# Patient Record
Sex: MALE | Race: WHITE | Employment: OTHER | URBAN - METROPOLITAN AREA
[De-identification: names, ages, dates, MRNs, and addresses within clinical notes are randomized per-mention and may not be internally consistent; named-entity substitution may affect disease eponyms.]

---

## 2017-05-10 ENCOUNTER — OFFICE VISIT (OUTPATIENT)
Dept: PRIMARY CARE CLINIC | Age: 77
End: 2017-05-10
Payer: MEDICARE

## 2017-05-10 VITALS
BODY MASS INDEX: 29.93 KG/M2 | HEART RATE: 56 BPM | RESPIRATION RATE: 16 BRPM | HEIGHT: 72 IN | DIASTOLIC BLOOD PRESSURE: 80 MMHG | SYSTOLIC BLOOD PRESSURE: 130 MMHG | WEIGHT: 221 LBS

## 2017-05-10 DIAGNOSIS — E78.5 DYSLIPIDEMIA: Chronic | ICD-10-CM

## 2017-05-10 DIAGNOSIS — I10 ESSENTIAL HYPERTENSION: Primary | Chronic | ICD-10-CM

## 2017-05-10 DIAGNOSIS — Z12.5 ENCOUNTER FOR PROSTATE CANCER SCREENING: ICD-10-CM

## 2017-05-10 PROCEDURE — 99214 OFFICE O/P EST MOD 30 MIN: CPT | Performed by: INTERNAL MEDICINE

## 2017-05-10 ASSESSMENT — ENCOUNTER SYMPTOMS
SHORTNESS OF BREATH: 0
BLURRED VISION: 0
ABDOMINAL PAIN: 0
ORTHOPNEA: 0
WHEEZING: 0
CONSTIPATION: 0

## 2017-09-06 RX ORDER — ATENOLOL 100 MG/1
50 TABLET ORAL DAILY
Qty: 15 TABLET | Refills: 1 | Status: SHIPPED | OUTPATIENT
Start: 2017-09-06 | End: 2017-10-11 | Stop reason: ALTCHOICE

## 2017-09-18 ENCOUNTER — HOSPITAL ENCOUNTER (OUTPATIENT)
Age: 77
Discharge: HOME | End: 2017-09-18
Payer: MEDICARE

## 2017-09-18 DIAGNOSIS — Z12.5 ENCOUNTER FOR PROSTATE CANCER SCREENING: ICD-10-CM

## 2017-09-18 DIAGNOSIS — E78.5 DYSLIPIDEMIA: Chronic | ICD-10-CM

## 2017-09-18 DIAGNOSIS — I10 ESSENTIAL HYPERTENSION: Chronic | ICD-10-CM

## 2017-09-18 LAB
ALBUMIN SERPL-MCNC: 4.3 G/DL (ref 3.5–5.2)
ALBUMIN/GLOBULIN RATIO: 1.7 (ref 1–2.5)
ALP BLD-CCNC: 70 U/L (ref 40–129)
ALT SERPL-CCNC: 17 U/L (ref 5–41)
ANION GAP SERPL CALCULATED.3IONS-SCNC: 13 MMOL/L (ref 9–17)
AST SERPL-CCNC: 18 U/L
BILIRUB SERPL-MCNC: 0.89 MG/DL (ref 0.3–1.2)
BUN BLDV-MCNC: 21 MG/DL (ref 8–23)
BUN/CREAT BLD: ABNORMAL (ref 9–20)
CALCIUM SERPL-MCNC: 9.1 MG/DL (ref 8.6–10.4)
CHLORIDE BLD-SCNC: 105 MMOL/L (ref 98–107)
CHOLESTEROL/HDL RATIO: 2.9
CHOLESTEROL: 171 MG/DL
CO2: 23 MMOL/L (ref 20–31)
CREAT SERPL-MCNC: 0.99 MG/DL (ref 0.7–1.2)
GFR AFRICAN AMERICAN: >60 ML/MIN
GFR NON-AFRICAN AMERICAN: >60 ML/MIN
GFR SERPL CREATININE-BSD FRML MDRD: ABNORMAL ML/MIN/{1.73_M2}
GFR SERPL CREATININE-BSD FRML MDRD: ABNORMAL ML/MIN/{1.73_M2}
GLUCOSE BLD-MCNC: 101 MG/DL (ref 70–99)
HCT VFR BLD CALC: 49.4 % (ref 41–53)
HDLC SERPL-MCNC: 58 MG/DL
HEMOGLOBIN: 16.5 G/DL (ref 13.5–17.5)
LDL CHOLESTEROL: 99 MG/DL (ref 0–130)
MCH RBC QN AUTO: 31.8 PG (ref 26–34)
MCHC RBC AUTO-ENTMCNC: 33.4 G/DL (ref 31–37)
MCV RBC AUTO: 95 FL (ref 80–100)
PDW BLD-RTO: 14.1 % (ref 12.5–15.4)
PLATELET # BLD: 128 K/UL (ref 140–450)
PMV BLD AUTO: 8.6 FL (ref 6–12)
POTASSIUM SERPL-SCNC: 4.4 MMOL/L (ref 3.7–5.3)
PROSTATE SPECIFIC ANTIGEN: 2.98 UG/L
RBC # BLD: 5.2 M/UL (ref 4.5–5.9)
SODIUM BLD-SCNC: 141 MMOL/L (ref 135–144)
TOTAL PROTEIN: 6.8 G/DL (ref 6.4–8.3)
TRIGL SERPL-MCNC: 71 MG/DL
TSH SERPL DL<=0.05 MIU/L-ACNC: 2.37 MIU/L (ref 0.3–5)
VLDLC SERPL CALC-MCNC: NORMAL MG/DL (ref 1–30)
WBC # BLD: 5.4 K/UL (ref 3.5–11)

## 2017-09-18 PROCEDURE — 80061 LIPID PANEL: CPT

## 2017-09-18 PROCEDURE — 85027 COMPLETE CBC AUTOMATED: CPT

## 2017-09-18 PROCEDURE — 84443 ASSAY THYROID STIM HORMONE: CPT

## 2017-09-18 PROCEDURE — G0103 PSA SCREENING: HCPCS

## 2017-09-18 PROCEDURE — 36415 COLL VENOUS BLD VENIPUNCTURE: CPT

## 2017-09-18 PROCEDURE — 80053 COMPREHEN METABOLIC PANEL: CPT

## 2017-10-02 RX ORDER — ATORVASTATIN CALCIUM 10 MG/1
TABLET, FILM COATED ORAL
Qty: 90 TABLET | Refills: 3 | Status: SHIPPED | OUTPATIENT
Start: 2017-10-02 | End: 2018-10-01 | Stop reason: SDUPTHER

## 2017-10-09 RX ORDER — ATENOLOL 100 MG/1
50 TABLET ORAL DAILY
Qty: 15 TABLET | Refills: 0 | OUTPATIENT
Start: 2017-10-09

## 2017-10-09 NOTE — TELEPHONE ENCOUNTER
Not my patient?>>  The patient has two Rx's in the chart fro atenolol  50 mg qd  100 mg 1/2 daily  If he is only taking 50 mg / day - why is he breaking 100's in half  Does he want 30 d or 90 d supply  Please clarify

## 2017-10-09 NOTE — TELEPHONE ENCOUNTER
Last OV 5/10/17  Dr Elizabeth Schmitt Maintenance   Topic Date Due    Flu vaccine (1) 09/01/2017    Lipid screen  09/18/2022    DTaP/Tdap/Td vaccine (2 - Td) 11/15/2026    Zostavax vaccine  Addressed    Pneumococcal low/med risk  Completed             (applicable per patient's age: Cancer Screenings, Depression Screening, Fall Risk Screening, Immunizations)    LDL Cholesterol (mg/dL)   Date Value   09/18/2017 99     LDL Calculated (mg/dL)   Date Value   03/14/2013 86     AST (U/L)   Date Value   09/18/2017 18     ALT (U/L)   Date Value   09/18/2017 17     BUN (mg/dL)   Date Value   09/18/2017 21      (goal A1C is < 7)   (goal LDL is <100) need 30-50% reduction from baseline     BP Readings from Last 3 Encounters:   05/10/17 130/80   10/04/16 130/74   03/24/16 132/82    (goal /80)      All Future Testing planned in [facility]:      Next Visit Date:  Future Appointments  Date Time Provider Patricia Marrero   10/11/2017 10:30 AM Alpa Tee CNP [facility]   12/11/2017 2:20 PM Alcides Chanel DO [facility]            Patient Active Problem List:     HTN (hypertension)     Dyslipidemia     GERD (gastroesophageal reflux disease)     Diverticulosis of colon     History of basal cell cancer

## 2017-10-10 NOTE — TELEPHONE ENCOUNTER
The Pharmacy is out of Atenolol 50mg so it must be called in as 100mg 1/2 daily  Pt has appt this week in office  Qty called to [address] with 0 RF as pt is out of medication

## 2017-10-11 ENCOUNTER — OFFICE VISIT (OUTPATIENT)
Dept: PRIMARY CARE CLINIC | Age: 77
End: 2017-10-11
Payer: MEDICARE

## 2017-10-11 VITALS
WEIGHT: 222 LBS | HEIGHT: 71 IN | OXYGEN SATURATION: 96 % | BODY MASS INDEX: 31.08 KG/M2 | SYSTOLIC BLOOD PRESSURE: 152 MMHG | DIASTOLIC BLOOD PRESSURE: 86 MMHG | HEART RATE: 59 BPM

## 2017-10-11 DIAGNOSIS — I10 ESSENTIAL HYPERTENSION: Primary | Chronic | ICD-10-CM

## 2017-10-11 PROCEDURE — 99214 OFFICE O/P EST MOD 30 MIN: CPT | Performed by: NURSE PRACTITIONER

## 2017-10-11 PROCEDURE — G8427 DOCREV CUR MEDS BY ELIG CLIN: HCPCS | Performed by: NURSE PRACTITIONER

## 2017-10-11 PROCEDURE — 4040F PNEUMOC VAC/ADMIN/RCVD: CPT | Performed by: NURSE PRACTITIONER

## 2017-10-11 PROCEDURE — G8417 CALC BMI ABV UP PARAM F/U: HCPCS | Performed by: NURSE PRACTITIONER

## 2017-10-11 PROCEDURE — G8484 FLU IMMUNIZE NO ADMIN: HCPCS | Performed by: NURSE PRACTITIONER

## 2017-10-11 PROCEDURE — 1123F ACP DISCUSS/DSCN MKR DOCD: CPT | Performed by: NURSE PRACTITIONER

## 2017-10-11 PROCEDURE — 1036F TOBACCO NON-USER: CPT | Performed by: NURSE PRACTITIONER

## 2017-10-11 RX ORDER — ATENOLOL 50 MG/1
TABLET ORAL
Qty: 90 TABLET | Refills: 3 | Status: SHIPPED | OUTPATIENT
Start: 2017-10-11 | End: 2018-10-01 | Stop reason: SDUPTHER

## 2017-10-11 RX ORDER — LISINOPRIL 20 MG/1
20 TABLET ORAL DAILY
Qty: 30 TABLET | Refills: 3 | Status: SHIPPED | OUTPATIENT
Start: 2017-10-11 | End: 2017-10-18 | Stop reason: SDUPTHER

## 2017-10-11 ASSESSMENT — ENCOUNTER SYMPTOMS
BLURRED VISION: 0
ABDOMINAL PAIN: 0
SHORTNESS OF BREATH: 0
COUGH: 0
ORTHOPNEA: 0
BACK PAIN: 0

## 2017-10-11 NOTE — PATIENT INSTRUCTIONS
Starting 10/12/17 Take lisinopril in the am and then take atenolol at night. lisinopril  Pronunciation:  lyse IN oh pril  Brand:  Prinivil, Qbrelis, Zestril  What is the most important information I should know about lisinopril? Do not use lisinopril if you are pregnant. It could harm the unborn baby. Stop using this medicine and tell your doctor right away if you become pregnant. You should not use lisinopril if you have hereditary angioedema. If you have diabetes, do not use lisinopril together with any medication that contains aliskiren (such as Amturnide, Tekturna, Tekamlo). What is lisinopril? Lisinopril is an ACE inhibitor. ACE stands for angiotensin converting enzyme. Lisinopril is used to treat high blood pressure (hypertension) in adults and children who are at least 10years old. Lisinopril is also used to treat congestive heart failure in adults, or to improve survival after a heart attack. Lisinopril may also be used for purposes not listed in this medication guide. What should I discuss with my healthcare provider before taking lisinopril? You should not use this medication if you are allergic to lisinopril or to any other ACE inhibitor, such as benazepril captopril, fosinopril, enalapril, moexipril, perindopril, quinapril, ramipril, or trandolapril. If you have diabetes, do not use lisinopril together with any medication that contains aliskiren (Amturnide, Tekturna, Tekamlo). You may also need to avoid taking lisinopril with aliskiren if you have kidney disease. You should not use lisinopril if you have hereditary angioedema. To make sure lisinopril is safe for you, tell your doctor if you have:  · kidney disease (or if you are on dialysis);  · liver disease;  · diabetes; or  · high levels of potassium in your blood. Do not use if you are pregnant. If you become pregnant, stop taking this medicine and tell your doctor right away.  Lisinopril can cause injury or death to the unborn baby if you take the medicine during your second or third trimester. It is not known whether lisinopril passes into breast milk or if it could harm a nursing baby. You should not breast-feed while using this medicine. How should I take lisinopril? Follow all directions on your prescription label. Your doctor may occasionally change your dose to make sure you get the best results. Do not take this medicine in larger or smaller amounts or for longer than recommended. Drink plenty of water each day while you are taking this medicine. Lisinopril can be taken with or without food. Measure liquid medicine with the dosing syringe provided, or with a special dose-measuring spoon or medicine cup. If you do not have a dose-measuring device, ask your pharmacist for one. Your blood pressure will need to be checked often, and you may need frequent blood tests. Call your doctor if you have ongoing vomiting or diarrhea, or if you are sweating more than usual. You can easily become dehydrated while taking this medicine. This can lead to very low blood pressure, electrolyte disorders, or kidney failure while you are taking lisinopril. If you need surgery, tell the surgeon ahead of time that you are using lisinopril. You may need to stop using the medicine for a short time. If you are being treated for high blood pressure, keep using this medicine even if you feel well. High blood pressure often has no symptoms. You may need to use blood pressure medicine for the rest of your life. Store at room temperature away from moisture and heat. Do not freeze the oral liquid. What happens if I miss a dose? Take the missed dose as soon as you remember. Skip the missed dose if it is almost time for your next scheduled dose. Do not take extra medicine to make up the missed dose. What happens if I overdose? Seek emergency medical attention or call the Poison Help line at 1-412.655.4939.   What should I avoid while taking temsirolimus; or  · NSAIDs (nonsteroidal anti-inflammatory drugs) --aspirin, ibuprofen (Advil, Motrin), naproxen (Aleve), celecoxib, diclofenac, indomethacin, meloxicam, and others. This list is not complete. Other drugs may interact with lisinopril, including prescription and over-the-counter medicines, vitamins, and herbal products. Not all possible interactions are listed in this medication guide. Where can I get more information? Your pharmacist can provide more information about lisinopril. Remember, keep this and all other medicines out of the reach of children, never share your medicines with others, and use this medication only for the indication prescribed. Every effort has been made to ensure that the information provided by [facility] is accurate, up-to-date, and complete, but no guarantee is made to that effect. Drug information contained herein may be time sensitive. [facility] information has been compiled for use by healthcare practitioners and consumers in the [place] and therefore [facility] does not warrant that uses outside of the [place] are appropriate, unless specifically indicated otherwise. [facility] drug information does not endorse drugs, diagnose patients or recommend therapy. [facility] drug information is an informational resource designed to assist licensed healthcare practitioners in caring for their patients and/or to serve consumers viewing this service as a supplement to, and not a substitute for, the expertise, skill, knowledge and judgment of healthcare practitioners. The absence of a warning for a given drug or drug combination in no way should be construed to indicate that the drug or drug combination is safe, effective or appropriate for any given patient. [facility] does not assume any responsibility for any aspect of healthcare administered with the aid of information [facility] provides.  The information contained herein is not intended to cover all possible uses, directions, precautions, warnings, drug interactions, allergic reactions, or adverse effects. If you have questions about the drugs you are taking, check with your doctor, nurse or pharmacist.  Copyright 1886-6749 [address]: 13.04. Revision date: 9/19/2016. Care instructions adapted under license by [facility] ([facility]). If you have questions about a medical condition or this instruction, always ask your healthcare professional. [place] [address] any warranty or liability for your use of this information.

## 2017-10-18 ENCOUNTER — OFFICE VISIT (OUTPATIENT)
Dept: PRIMARY CARE CLINIC | Age: 77
End: 2017-10-18
Payer: MEDICARE

## 2017-10-18 VITALS — HEART RATE: 68 BPM | DIASTOLIC BLOOD PRESSURE: 72 MMHG | SYSTOLIC BLOOD PRESSURE: 150 MMHG

## 2017-10-18 DIAGNOSIS — I10 ESSENTIAL HYPERTENSION: Chronic | ICD-10-CM

## 2017-10-18 PROCEDURE — G8417 CALC BMI ABV UP PARAM F/U: HCPCS | Performed by: NURSE PRACTITIONER

## 2017-10-18 PROCEDURE — 4040F PNEUMOC VAC/ADMIN/RCVD: CPT | Performed by: NURSE PRACTITIONER

## 2017-10-18 PROCEDURE — 1123F ACP DISCUSS/DSCN MKR DOCD: CPT | Performed by: NURSE PRACTITIONER

## 2017-10-18 PROCEDURE — 1036F TOBACCO NON-USER: CPT | Performed by: NURSE PRACTITIONER

## 2017-10-18 PROCEDURE — 99213 OFFICE O/P EST LOW 20 MIN: CPT | Performed by: NURSE PRACTITIONER

## 2017-10-18 PROCEDURE — G8427 DOCREV CUR MEDS BY ELIG CLIN: HCPCS | Performed by: NURSE PRACTITIONER

## 2017-10-18 PROCEDURE — G8484 FLU IMMUNIZE NO ADMIN: HCPCS | Performed by: NURSE PRACTITIONER

## 2017-10-18 RX ORDER — LISINOPRIL 20 MG/1
40 TABLET ORAL DAILY
Qty: 30 TABLET | Refills: 3 | Status: SHIPPED | OUTPATIENT
Start: 2017-10-18 | End: 2018-02-19 | Stop reason: SDUPTHER

## 2017-10-18 ASSESSMENT — ENCOUNTER SYMPTOMS
SHORTNESS OF BREATH: 0
BACK PAIN: 0
COUGH: 0
ORTHOPNEA: 0
BLURRED VISION: 0
ABDOMINAL PAIN: 0

## 2017-10-18 NOTE — PROGRESS NOTES
[facility]  [facility]  [address]  [address]  [place] [address]  Dept: [phone]  Dept Fax: [phone]    Kori Castillo is a 65 y.o. male who presents today for his medical conditions/complaints as noted below. Kori Castillo is c/o of Hypertension (recheck after one week with lisinopril)        HPI:     Did not bring in results from [address] [facility] BP ranges from 138-174/    Also mentions that at health fair he was identified to be at risk for osteoporosis. Does not know which test they used to tell him this      Hypertension   This is a chronic problem. The current episode started more than 1 year ago. The problem has been gradually improving since onset. The problem is resistant. Pertinent negatives include no anxiety, blurred vision, chest pain, headaches, malaise/fatigue, neck pain, orthopnea, palpitations, peripheral edema, PND, shortness of breath or sweats. Risk factors for coronary artery disease include obesity and male gender. Past treatments include ACE inhibitors and beta blockers. The current treatment provides moderate improvement. There are no compliance problems. Past Medical History:   Diagnosis Date    Cancer ([address]     basal cell    Diverticulosis     coli    Plantar fasciitis       Past Surgical History:   Procedure Laterality Date    CATARACT REMOVAL WITH IMPLANT Bilateral may 2013    COLONOSCOPY      HERNIA REPAIR      SKIN CANCER EXCISION      multi sites       History reviewed. No pertinent family history.     Social History   Substance Use Topics    Smoking status: Former Smoker     Types: Cigarettes     Quit date: 1/1/1981    Smokeless tobacco: Never Used    Alcohol use Yes      Comment: 1 drink daily      Current Outpatient Prescriptions   Medication Sig Dispense Refill    lisinopril (PRINIVIL;ZESTRIL) 20 MG tablet Take 2 tablets by mouth daily 30 tablet 3    atenolol (TENORMIN) 50 MG tablet TAKE ONE TABLET BY MOUTH ONCE DAILY 90 tablet 3    atorvastatin (LIPITOR) 10 MG tablet TAKE ONE TABLET BY MOUTH ONCE DAILY 90 tablet 3    clobetasol (TEMOVATE) 0.05 % external solution       cimetidine (TAGAMET HB) 200 MG tablet Take 200 mg by mouth 2 times daily      diphenhydrAMINE (BENADRYL) 25 MG capsule Take 25 mg by mouth every 6 hours as needed for Itching      cetirizine (ZYRTEC) 10 MG tablet Take 10 mg by mouth daily      halobetasol (ULTRAVATE) 0.05 % ointment       Multiple Vitamins-Minerals (THERAPEUTIC MULTIVITAMIN-MINERALS) tablet Take 1 tablet by mouth daily.  aspirin 81 MG tablet Take  by mouth daily. No current facility-administered medications for this visit. Allergies   Allergen Reactions    Sulfa Antibiotics        Health Maintenance   Topic Date Due    Flu vaccine (1) 09/01/2017    Lipid screen  09/18/2022    DTaP/Tdap/Td vaccine (2 - Td) 11/15/2026    Zostavax vaccine  Addressed    Pneumococcal low/med risk  Completed       Subjective:      Review of Systems   Constitutional: Negative for chills, fever and malaise/fatigue. HENT: Negative for congestion. Eyes: Negative for blurred vision and visual disturbance. Respiratory: Negative for cough and shortness of breath. Cardiovascular: Negative for chest pain, palpitations, orthopnea and PND. Gastrointestinal: Negative for abdominal pain. Genitourinary: Negative for difficulty urinating and dysuria. Musculoskeletal: Negative for arthralgias, back pain and neck pain. Neurological: Negative for dizziness and headaches. Psychiatric/Behavioral: Negative for self-injury and sleep disturbance. Objective:     Physical Exam   Constitutional: He is oriented to person, place, and time. He appears well-developed and well-nourished. HENT:   Head: Normocephalic and atraumatic. Eyes: Pupils are equal, round, and reactive to light. Neck: Normal range of motion.    Cardiovascular: Normal rate, regular rhythm and normal heart sounds. Pulmonary/Chest: Effort normal and breath sounds normal.   Abdominal: Soft. Bowel sounds are normal. There is no tenderness. Musculoskeletal: Normal range of motion. Neurological: He is alert and oriented to person, place, and time. Skin: Skin is warm and dry. Psychiatric: He has a normal mood and affect. His behavior is normal. Judgment and thought content normal.   Nursing note and vitals reviewed. BP (!) 150/72   Pulse 68     Assessment:      1. Essential hypertension  lisinopril (PRINIVIL;ZESTRIL) 20 MG tablet       Plan:      Return in about 1 week (around 10/25/2017) for hypertension. 1. HTN- Improving. Increase lisinopril to 40mg daily. Continue to monitor BP at home. Follow up in one week to recheck BP. Patient is instructed to bring all paperwork from health fair and home BP machine. Orders Placed This Encounter   Medications    lisinopril (PRINIVIL;ZESTRIL) 20 MG tablet     Sig: Take 2 tablets by mouth daily     Dispense:  30 tablet     Refill:  3       Patient given educational materials - see patient instructions. Discussed use, benefit, and side effects of prescribed medications. All patient questions answered. Pt voiced understanding. Reviewed health maintenance. Instructed to continue current medications, diet and exercise. Patient agreed with treatment plan. Follow up as directed.       Electronically signed by Adonis Holly CNP on 10/18/2017 at 11:15 AM

## 2017-10-18 NOTE — PATIENT INSTRUCTIONS
Please bring in all paper work from health fair  Please bring home BP monitor to check with our readings, continue to monitor BP home  Increase lisinopril to 40mg (or two tabs 20mg) both at morning

## 2017-10-25 ENCOUNTER — OFFICE VISIT (OUTPATIENT)
Dept: PRIMARY CARE CLINIC | Age: 77
End: 2017-10-25
Payer: MEDICARE

## 2017-10-25 VITALS
HEIGHT: 70 IN | DIASTOLIC BLOOD PRESSURE: 86 MMHG | WEIGHT: 217 LBS | SYSTOLIC BLOOD PRESSURE: 164 MMHG | BODY MASS INDEX: 31.07 KG/M2 | HEART RATE: 61 BPM | OXYGEN SATURATION: 97 %

## 2017-10-25 DIAGNOSIS — I10 ESSENTIAL HYPERTENSION: Primary | Chronic | ICD-10-CM

## 2017-10-25 PROCEDURE — G8427 DOCREV CUR MEDS BY ELIG CLIN: HCPCS | Performed by: NURSE PRACTITIONER

## 2017-10-25 PROCEDURE — G8417 CALC BMI ABV UP PARAM F/U: HCPCS | Performed by: NURSE PRACTITIONER

## 2017-10-25 PROCEDURE — 4040F PNEUMOC VAC/ADMIN/RCVD: CPT | Performed by: NURSE PRACTITIONER

## 2017-10-25 PROCEDURE — 99213 OFFICE O/P EST LOW 20 MIN: CPT | Performed by: NURSE PRACTITIONER

## 2017-10-25 PROCEDURE — 1036F TOBACCO NON-USER: CPT | Performed by: NURSE PRACTITIONER

## 2017-10-25 PROCEDURE — 1123F ACP DISCUSS/DSCN MKR DOCD: CPT | Performed by: NURSE PRACTITIONER

## 2017-10-25 PROCEDURE — G8484 FLU IMMUNIZE NO ADMIN: HCPCS | Performed by: NURSE PRACTITIONER

## 2017-10-25 ASSESSMENT — ENCOUNTER SYMPTOMS
SHORTNESS OF BREATH: 0
ABDOMINAL PAIN: 0
BACK PAIN: 0
COUGH: 0

## 2017-10-25 ASSESSMENT — PATIENT HEALTH QUESTIONNAIRE - PHQ9
SUM OF ALL RESPONSES TO PHQ QUESTIONS 1-9: 0
SUM OF ALL RESPONSES TO PHQ9 QUESTIONS 1 & 2: 0
1. LITTLE INTEREST OR PLEASURE IN DOING THINGS: 0
2. FEELING DOWN, DEPRESSED OR HOPELESS: 0

## 2017-10-25 NOTE — PROGRESS NOTES
Visit Information    Have you changed or started any medications since your last visit including any over-the-counter medicines, vitamins, or herbal medicines? no   Are you having any side effects from any of your medications? -  no  Have you stopped taking any of your medications? Is so, why? -  no    Have you seen any other physician or provider since your last visit? No  Have you had any other diagnostic tests since your last visit? No  Have you been seen in the emergency room and/or had an admission to a hospital since we last saw you? No  Have you had your routine dental cleaning in the past 6 months? yes     Have you activated your [employer] account? If not, what are your barriers?  No      Patient Care Team:  Honey Marshall DO as PCP - General (Internal Medicine)    Medical History Review  Past Medical, Family, and Social History reviewed and does contribute to the patient presenting condition    Health Maintenance   Topic Date Due    Flu vaccine (1) 09/01/2017    Lipid screen  09/18/2022    DTaP/Tdap/Td vaccine (2 - Td) 11/15/2026    Zostavax vaccine  Addressed    Pneumococcal low/med risk  Completed

## 2017-10-25 NOTE — PROGRESS NOTES
No current facility-administered medications for this visit. Allergies   Allergen Reactions    Sulfa Antibiotics        Health Maintenance   Topic Date Due    Flu vaccine (1) 09/01/2017    Lipid screen  09/18/2022    DTaP/Tdap/Td vaccine (2 - Td) 11/15/2026    Zostavax vaccine  Addressed    Pneumococcal low/med risk  Completed       Subjective:      Review of Systems   Constitutional: Negative for chills and fever. HENT: Negative for congestion. Eyes: Negative for visual disturbance. Respiratory: Negative for cough and shortness of breath. Cardiovascular: Negative for chest pain and palpitations. Gastrointestinal: Negative for abdominal pain. Genitourinary: Negative for difficulty urinating and dysuria. Musculoskeletal: Negative for arthralgias and back pain. Neurological: Positive for dizziness. Negative for headaches. Psychiatric/Behavioral: Negative for self-injury and sleep disturbance. Objective:     Physical Exam   Constitutional: He is oriented to person, place, and time. He appears well-developed and well-nourished. HENT:   Head: Normocephalic and atraumatic. Eyes: Pupils are equal, round, and reactive to light. Neck: Normal range of motion. Cardiovascular: Normal rate, regular rhythm and normal heart sounds. Pulmonary/Chest: Effort normal and breath sounds normal.   Abdominal: Soft. Bowel sounds are normal. There is no tenderness. Musculoskeletal: Normal range of motion. Neurological: He is alert and oriented to person, place, and time. Skin: Skin is warm and dry. Psychiatric: He has a normal mood and affect. His behavior is normal. Judgment and thought content normal.   Nursing note and vitals reviewed. BP (!) 164/86   Pulse 61   Ht 5' 10\" (1.778 m)   Wt 217 lb (98.4 kg)   SpO2 97%   BMI 31.14 kg/m²     Assessment:      1.  Essential hypertension  [facility] - Allyssa Murray MD       Plan:      Return in about 1 month (around 11/25/2017) for hypertension. 1. HTN- Uncontrolled. Continue current medication. Rx given for referral to cardiology. Follow up in one month after being seen by Dr. Fermin Bee. Patient states an understanding. Orders Placed This Encounter   Procedures    [facility] [address] [facility] - Tami Perkins MD     Referral Priority:   Routine     Referral Type:   Consult for Advice and Opinion     Referral Reason:   Specialty Services Required     Referred to Provider:   Yuliana Chavez MD     Requested Specialty:   Cardiology     Number of Visits Requested:   1         Patient given educational materials - see patient instructions. Discussed use, benefit, and side effects of prescribed medications. All patient questions answered. Pt voiced understanding. Reviewed health maintenance. Instructed to continue current medications, diet and exercise. Patient agreed with treatment plan. Follow up as directed.       Electronically signed by Verneice Boxer, CNP on 10/25/2017 at 3:35 PM

## 2017-11-02 ENCOUNTER — TELEPHONE (OUTPATIENT)
Dept: PRIMARY CARE CLINIC | Age: 77
End: 2017-11-02

## 2017-11-02 NOTE — TELEPHONE ENCOUNTER
Pt called and said for the past several days his BP has been good and wants to know if you still want him to F/U with Cardiology.     Health Maintenance   Topic Date Due    Flu vaccine (1) 09/01/2017    Lipid screen  09/18/2022    DTaP/Tdap/Td vaccine (2 - Td) 11/15/2026    Zostavax vaccine  Addressed    Pneumococcal low/med risk  Completed             (applicable per patient's age: Cancer Screenings, Depression Screening, Fall Risk Screening, Immunizations)    LDL Cholesterol (mg/dL)   Date Value   09/18/2017 99     LDL Calculated (mg/dL)   Date Value   03/14/2013 86     AST (U/L)   Date Value   09/18/2017 18     ALT (U/L)   Date Value   09/18/2017 17     BUN (mg/dL)   Date Value   09/18/2017 21      (goal A1C is < 7)   (goal LDL is <100) need 30-50% reduction from baseline     BP Readings from Last 3 Encounters:   10/25/17 (!) 164/86   10/18/17 (!) 150/72   10/11/17 (!) 152/86    (goal /80)      All Future Testing planned in [facility]:      Next Visit Date:  Future Appointments  Date Time Provider Patricia Marrero   12/11/2017 2:20 PM DO Torey Acosta [facility]            Patient Active Problem List:     HTN (hypertension)     Dyslipidemia     GERD (gastroesophageal reflux disease)     Diverticulosis of colon     History of basal cell cancer

## 2017-11-08 NOTE — TELEPHONE ENCOUNTER
Called pt to get numbers-128/71 132/81 151/87 (highest) 135/83 143/82 139/73 145/82 129/77 117/73 (this am)

## 2017-12-11 ENCOUNTER — OFFICE VISIT (OUTPATIENT)
Dept: PRIMARY CARE CLINIC | Age: 77
End: 2017-12-11
Payer: MEDICARE

## 2017-12-11 VITALS
BODY MASS INDEX: 30.06 KG/M2 | RESPIRATION RATE: 16 BRPM | WEIGHT: 210 LBS | HEART RATE: 62 BPM | DIASTOLIC BLOOD PRESSURE: 74 MMHG | HEIGHT: 70 IN | SYSTOLIC BLOOD PRESSURE: 138 MMHG

## 2017-12-11 DIAGNOSIS — E78.5 DYSLIPIDEMIA: Chronic | ICD-10-CM

## 2017-12-11 DIAGNOSIS — D69.6 THROMBOCYTOPENIA (HCC): ICD-10-CM

## 2017-12-11 DIAGNOSIS — Z91.81 AT HIGH RISK FOR FALLS: ICD-10-CM

## 2017-12-11 DIAGNOSIS — I10 ESSENTIAL HYPERTENSION: Primary | Chronic | ICD-10-CM

## 2017-12-11 DIAGNOSIS — K57.30 DIVERTICULOSIS OF COLON: ICD-10-CM

## 2017-12-11 DIAGNOSIS — K21.9 GASTROESOPHAGEAL REFLUX DISEASE WITHOUT ESOPHAGITIS: Chronic | ICD-10-CM

## 2017-12-11 PROCEDURE — G8484 FLU IMMUNIZE NO ADMIN: HCPCS | Performed by: INTERNAL MEDICINE

## 2017-12-11 PROCEDURE — 1036F TOBACCO NON-USER: CPT | Performed by: INTERNAL MEDICINE

## 2017-12-11 PROCEDURE — G8427 DOCREV CUR MEDS BY ELIG CLIN: HCPCS | Performed by: INTERNAL MEDICINE

## 2017-12-11 PROCEDURE — 1123F ACP DISCUSS/DSCN MKR DOCD: CPT | Performed by: INTERNAL MEDICINE

## 2017-12-11 PROCEDURE — 4040F PNEUMOC VAC/ADMIN/RCVD: CPT | Performed by: INTERNAL MEDICINE

## 2017-12-11 PROCEDURE — G8417 CALC BMI ABV UP PARAM F/U: HCPCS | Performed by: INTERNAL MEDICINE

## 2017-12-11 PROCEDURE — 99213 OFFICE O/P EST LOW 20 MIN: CPT | Performed by: INTERNAL MEDICINE

## 2017-12-11 ASSESSMENT — ENCOUNTER SYMPTOMS
NAUSEA: 0
BLOOD IN STOOL: 0
HEARTBURN: 1
VOMITING: 0
SHORTNESS OF BREATH: 0
COUGH: 0
ABDOMINAL PAIN: 0

## 2017-12-11 NOTE — PROGRESS NOTES
[facility] PHYSICIANS  [facility] INTERNAL MEDICINE  [address]   [address]  [place] [address]  Dept: [phone]  Dept Fax: [phone]    Nancy Marcial is a 65 y.o. male who presents today for his medical conditions/complaints as noted below. Nancy Marcial is c/o of   Chief Complaint   Patient presents with    Hypertension     6 month f/u    Health Maintenance     received flu at pharmacy         HPI:     Hypertension   This is a chronic problem. The current episode started more than 1 year ago. The problem is unchanged. The problem is controlled. Pertinent negatives include no chest pain, palpitations or shortness of breath. The current treatment provides moderate improvement. Gastroesophageal Reflux   He complains of heartburn. He reports no abdominal pain, no chest pain, no coughing, no dysphagia or no nausea. This is a chronic problem. The problem occurs rarely. The problem has been unchanged. The treatment provided moderate relief. Rash: On both legs  Derm - sees Dr Aung Duong  On tagament        No results found for: LABA1C          ( goal A1C is < 7)   No results found for: LABMICR  LDL Cholesterol (mg/dL)   Date Value   09/18/2017 99   09/13/2016 100     LDL Calculated (mg/dL)   Date Value   03/14/2013 86       (goal LDL is <100)   AST (U/L)   Date Value   09/18/2017 18     ALT (U/L)   Date Value   09/18/2017 17     BUN (mg/dL)   Date Value   09/18/2017 21     BP Readings from Last 3 Encounters:   12/11/17 138/74   10/25/17 (!) 164/86   10/18/17 (!) 150/72          (goal 120/80)    Past Medical History:   Diagnosis Date    Cancer ([address]     basal cell    Diverticulosis     coli    Plantar fasciitis       Past Surgical History:   Procedure Laterality Date    CATARACT REMOVAL WITH IMPLANT Bilateral may 2013    COLONOSCOPY      HERNIA REPAIR      SKIN CANCER EXCISION      multi sites       History reviewed. No pertinent family history.     Social History   Substance Use Topics    Smoking status: Former Smoker     Types: Cigarettes     Quit date: 1/1/1981    Smokeless tobacco: Never Used    Alcohol use Yes      Comment: 1 drink daily      Current Outpatient Prescriptions   Medication Sig Dispense Refill    lisinopril (PRINIVIL;ZESTRIL) 20 MG tablet Take 2 tablets by mouth daily 30 tablet 3    atenolol (TENORMIN) 50 MG tablet TAKE ONE TABLET BY MOUTH ONCE DAILY 90 tablet 3    atorvastatin (LIPITOR) 10 MG tablet TAKE ONE TABLET BY MOUTH ONCE DAILY 90 tablet 3    clobetasol (TEMOVATE) 0.05 % external solution       cimetidine (TAGAMET HB) 200 MG tablet Take 200 mg by mouth 2 times daily      diphenhydrAMINE (BENADRYL) 25 MG capsule Take 25 mg by mouth every 6 hours as needed for Itching      cetirizine (ZYRTEC) 10 MG tablet Take 10 mg by mouth daily      halobetasol (ULTRAVATE) 0.05 % ointment       Multiple Vitamins-Minerals (THERAPEUTIC MULTIVITAMIN-MINERALS) tablet Take 1 tablet by mouth daily.  aspirin 81 MG tablet Take  by mouth daily. No current facility-administered medications for this visit. Allergies   Allergen Reactions    Sulfa Antibiotics        Health Maintenance   Topic Date Due    Lipid screen  09/18/2022    DTaP/Tdap/Td vaccine (2 - Td) 11/15/2026    Zostavax vaccine  Addressed    Flu vaccine  Completed    Pneumococcal low/med risk  Completed       Subjective:      Review of Systems   Constitutional: Positive for unexpected weight change (back on Weight Watchers, weight is up). Negative for activity change. Respiratory: Negative for cough and shortness of breath. Cardiovascular: Negative for chest pain and palpitations. Gastrointestinal: Positive for heartburn. Negative for abdominal pain, blood in stool, dysphagia, nausea and vomiting. Genitourinary: Negative for difficulty urinating and hematuria. Musculoskeletal: Negative for arthralgias and myalgias. Skin: Positive for rash (Lower extremity).    Allergic/Immunologic: Negative for Watchers  Diet  Exercise  Weight loss  Dermatology evaluation  Hematology consultation - R/O vasculitis  Monitor Platelets  Will monitor and control Blood Pressure  Reflux precautions      Patient given educational materials - see patient instructions. Discussed use, benefit, and side effects of prescribed medications. All patient questions answered. Pt voiced understanding. Reviewed health maintenance. Instructed to continue current medications, diet and exercise. Patient agreed with treatment plan. Follow up as directed. Electronically signed by Jordy Mae DO on 12/11/2017 at 10:40 PM    On the basis of positive falls risk screening, assessment and plan is as follows: home safety tips provided.

## 2017-12-12 ENCOUNTER — TELEPHONE (OUTPATIENT)
Dept: ONCOLOGY | Age: 77
End: 2017-12-12

## 2017-12-27 ENCOUNTER — INITIAL CONSULT (OUTPATIENT)
Dept: ONCOLOGY | Age: 77
End: 2017-12-27
Payer: MEDICARE

## 2017-12-27 VITALS
WEIGHT: 212.9 LBS | SYSTOLIC BLOOD PRESSURE: 142 MMHG | HEART RATE: 54 BPM | BODY MASS INDEX: 30.48 KG/M2 | HEIGHT: 70 IN | DIASTOLIC BLOOD PRESSURE: 81 MMHG | TEMPERATURE: 97.5 F

## 2017-12-27 DIAGNOSIS — D69.6 THROMBOCYTOPENIA (HCC): Primary | ICD-10-CM

## 2017-12-27 PROCEDURE — 99204 OFFICE O/P NEW MOD 45 MIN: CPT | Performed by: INTERNAL MEDICINE

## 2017-12-27 PROCEDURE — 99201 HC NEW PT, E/M LEVEL 1: CPT

## 2017-12-27 ASSESSMENT — ENCOUNTER SYMPTOMS
BLURRED VISION: 0
ABDOMINAL PAIN: 0
HEMOPTYSIS: 0
DIARRHEA: 0
WHEEZING: 0
VOMITING: 0
BLOOD IN STOOL: 0
CONSTIPATION: 0
SHORTNESS OF BREATH: 0
NAUSEA: 0
SORE THROAT: 0
COUGH: 0

## 2017-12-27 NOTE — PROGRESS NOTES
CONSULT NOTE    PCP: Celso Rodriguez CNP  Referring Provider: Linh Murguia DO    VISIT DIAGNOSIS:  The encounter diagnosis was Thrombocytopenia ([address] SUBJECTIVE/HPI:  Gonzalo James is a very pleasant 65 y.o. male who is presenting for consultation. He was kindly referred to me by his primary care physician for evaluation of thrombocytopenia. He feels well. He denies having any bruising or bleeding. He's been in his normal state of health when he recently had routine labs carried out at the request of his primary care physician. This did document a mild from cytopenia. He has no other complaints today. PAST MEDICAL HISTORY:      Diagnosis Date    Cancer ([address]     basal cell    Diverticulosis     coli    Plantar fasciitis        PAST SURGICAL HISTORY:      Procedure Laterality Date    CATARACT REMOVAL WITH IMPLANT Bilateral may 2013    COLONOSCOPY      HERNIA REPAIR      SKIN CANCER EXCISION      multi sites       CURRENT MEDICATIONS:   Current Outpatient Prescriptions   Medication Sig Dispense Refill    lisinopril (PRINIVIL;ZESTRIL) 20 MG tablet Take 2 tablets by mouth daily 30 tablet 3    atenolol (TENORMIN) 50 MG tablet TAKE ONE TABLET BY MOUTH ONCE DAILY 90 tablet 3    atorvastatin (LIPITOR) 10 MG tablet TAKE ONE TABLET BY MOUTH ONCE DAILY 90 tablet 3    clobetasol (TEMOVATE) 0.05 % external solution       cimetidine (TAGAMET HB) 200 MG tablet Take 200 mg by mouth 2 times daily      diphenhydrAMINE (BENADRYL) 25 MG capsule Take 25 mg by mouth every 6 hours as needed for Itching      cetirizine (ZYRTEC) 10 MG tablet Take 10 mg by mouth daily      halobetasol (ULTRAVATE) 0.05 % ointment       Multiple Vitamins-Minerals (THERAPEUTIC MULTIVITAMIN-MINERALS) tablet Take 1 tablet by mouth daily.  aspirin 81 MG tablet Take  by mouth daily. No current facility-administered medications for this visit.         ALLERGIES:   Allergies   Allergen Reactions    Sulfa Antibiotics        FAMILY HISTORY:   History reviewed. No pertinent family history. SOCIAL HISTORY:  Social History   Substance Use Topics    Smoking status: Former Smoker     Types: Cigarettes     Quit date: 1/1/1981    Smokeless tobacco: Never Used    Alcohol use Yes      Comment: 1 drink daily       REVIEW OF SYSTEMS:   Review of Systems   Constitutional: Negative for chills and fever. HENT: Negative for nosebleeds and sore throat. Eyes: Negative for blurred vision. Respiratory: Negative for cough, hemoptysis, shortness of breath and wheezing. Cardiovascular: Negative for chest pain, palpitations and leg swelling. Gastrointestinal: Negative for abdominal pain, blood in stool, constipation, diarrhea, nausea and vomiting. Genitourinary: Negative for dysuria, flank pain, frequency, hematuria and urgency. Musculoskeletal:        No new aches, pains reported. Skin: Negative for rash. Neurological: Negative for dizziness, focal weakness, weakness and headaches. Endo/Heme/Allergies: Does not bruise/bleed easily. All other systems reviewed and are negative. PHYSICAL EXAM:   Vitals:    12/27/17 1145   BP: (!) 142/81   Pulse: 54   Temp: 97.5 °F (36.4 °C)       Physical Exam   Constitutional: He is oriented to person, place, and time. He appears well-developed and well-nourished. No distress. HENT:   Head: Normocephalic and atraumatic. Eyes: Pupils are equal, round, and reactive to light. Neck: Neck supple. Cardiovascular: Normal rate, regular rhythm and normal heart sounds. No murmur heard. Pulmonary/Chest: Effort normal and breath sounds normal. No stridor. No respiratory distress. He has no wheezes. Abdominal: Soft. Bowel sounds are normal. He exhibits no distension. There is no tenderness. Musculoskeletal: Normal range of motion. He exhibits no edema. Neurological: He is alert and oriented to person, place, and time. No cranial nerve deficit.    Skin: Skin is warm and dry. No rash noted. Psychiatric: He has a normal mood and affect. His behavior is normal.   Nursing note and vitals reviewed. LABS:   Results for orders placed or performed during the hospital encounter of 09/18/17   CBC   Result Value Ref Range    WBC 5.4 3.5 - 11.0 k/uL    RBC 5.20 4.5 - 5.9 m/uL    Hemoglobin 16.5 13.5 - 17.5 g/dL    Hematocrit 49.4 41 - 53 %    MCV 95.0 80 - 100 fL    MCH 31.8 26 - 34 pg    MCHC 33.4 31 - 37 g/dL    RDW 14.1 12.5 - 15.4 %    Platelets 982 (L) 887 - 450 k/uL    MPV 8.6 6.0 - 12.0 fL   Comprehensive Metabolic Panel   Result Value Ref Range    Glucose 101 (H) 70 - 99 mg/dL    BUN 21 8 - 23 mg/dL    CREATININE 0.99 0.70 - 1.20 mg/dL    Bun/Cre Ratio NOT REPORTED 9 - 20    Calcium 9.1 8.6 - 10.4 mg/dL    Sodium 141 135 - 144 mmol/L    Potassium 4.4 3.7 - 5.3 mmol/L    Chloride 105 98 - 107 mmol/L    CO2 23 20 - 31 mmol/L    Anion Gap 13 9 - 17 mmol/L    Alkaline Phosphatase 70 40 - 129 U/L    ALT 17 5 - 41 U/L    AST 18 <40 U/L    Total Bilirubin 0.89 0.3 - 1.2 mg/dL    Total Protein 6.8 6.4 - 8.3 g/dL    Alb 4.3 3.5 - 5.2 g/dL    Albumin/Globulin Ratio 1.7 1.0 - 2.5    GFR Non-African American >60 >60 mL/min    GFR African American >60 >60 mL/min    GFR Comment          GFR Staging NOT REPORTED    TSH without Reflex   Result Value Ref Range    TSH 2.37 0.30 - 5.00 mIU/L   Psa screening   Result Value Ref Range    PSA 2.98 <4.1 ug/L   Lipid Panel   Result Value Ref Range    Cholesterol 171 <200 mg/dL    HDL 58 >40 mg/dL    LDL Cholesterol 99 0 - 130 mg/dL    Chol/HDL Ratio 2.9 <5    Triglycerides 71 <150 mg/dL    VLDL NOT REPORTED 1 - 30 mg/dL       IMPRESSION:     1.  Thrombocytopenia ([address]        Patient Active Problem List   Diagnosis    HTN (hypertension)    Dyslipidemia    GERD (gastroesophageal reflux disease)    Diverticulosis of colon    History of basal cell cancer    Thrombocytopenia ([address]       PLAN:     1. I had a long conversation with the patient and greater

## 2018-02-19 DIAGNOSIS — I10 ESSENTIAL HYPERTENSION: Chronic | ICD-10-CM

## 2018-02-19 RX ORDER — LISINOPRIL 20 MG/1
40 TABLET ORAL DAILY
Qty: 30 TABLET | Refills: 3 | Status: SHIPPED | OUTPATIENT
Start: 2018-02-19 | End: 2018-02-23 | Stop reason: SDUPTHER

## 2018-02-23 ENCOUNTER — TELEPHONE (OUTPATIENT)
Dept: PRIMARY CARE CLINIC | Age: 78
End: 2018-02-23

## 2018-02-23 DIAGNOSIS — I10 ESSENTIAL HYPERTENSION: Chronic | ICD-10-CM

## 2018-02-23 RX ORDER — LISINOPRIL 20 MG/1
40 TABLET ORAL DAILY
Qty: 60 TABLET | Refills: 3 | Status: SHIPPED | OUTPATIENT
Start: 2018-02-23 | End: 2018-03-13 | Stop reason: SDUPTHER

## 2018-02-23 NOTE — TELEPHONE ENCOUNTER
Pt Came in stating he usually takes 2 20mg of lisinopril tablets an his Paticia Carbo is usually 60 but the the script has 30 Pt asked if you could fix the rx an send another. Violet Christianson

## 2018-03-13 DIAGNOSIS — I10 ESSENTIAL HYPERTENSION: Chronic | ICD-10-CM

## 2018-03-13 RX ORDER — LISINOPRIL 20 MG/1
40 TABLET ORAL DAILY
Qty: 180 TABLET | Refills: 3 | Status: SHIPPED | OUTPATIENT
Start: 2018-03-13 | End: 2019-05-02 | Stop reason: SDUPTHER

## 2018-03-13 NOTE — TELEPHONE ENCOUNTER
Last OV 12/11/2017      Health Maintenance   Topic Date Due    Shingles Vaccine (1 of 2 - 2 Dose Series) 07/16/1989    Potassium monitoring  09/18/2018    Creatinine monitoring  09/18/2018    Lipid screen  09/18/2022    DTaP/Tdap/Td vaccine (2 - Td) 11/15/2026    Flu vaccine  Completed    Pneumococcal low/med risk  Completed             (applicable per patient's age: Cancer Screenings, Depression Screening, Fall Risk Screening, Immunizations)    LDL Cholesterol (mg/dL)   Date Value   09/18/2017 99     LDL Calculated (mg/dL)   Date Value   03/14/2013 86     AST (U/L)   Date Value   09/18/2017 18     ALT (U/L)   Date Value   09/18/2017 17     BUN (mg/dL)   Date Value   09/18/2017 21      (goal A1C is < 7)   (goal LDL is <100) need 30-50% reduction from baseline     BP Readings from Last 3 Encounters:   12/27/17 (!) 142/81   12/11/17 138/74   10/25/17 (!) 164/86    (goal /80)      All Future Testing planned in [facility]:  Lab Frequency Next Occurrence   CBC Auto Differential Once 04/27/2018   Comprehensive Metabolic Panel Once 78/91/6951   Vitamin B12 & Folate Once 04/27/2018   Immunofixation serum profile Once 04/27/2018   Electrophoresis Protein, Serum without Reflex to Immunofixation Once 04/27/2018   Path Review, Smear Once 04/27/2018   Sedimentation Rate Once 04/27/2018   C-Reactive Protein Once 04/27/2018   POCT H. pylori antibodies Once 04/27/2018       Next Visit Date:  Future Appointments  Date Time Provider Patricia Escotoisti   4/25/2018 3:00 PM Dinesh Hamilton MD [address] [place] [address] 22   10/29/2018 2:20 PM Harinder Burton CNP [facility]            Patient Active Problem List:     HTN (hypertension)     Dyslipidemia     GERD (gastroesophageal reflux disease)     Diverticulosis of colon     History of basal cell cancer     Thrombocytopenia ([facility] [address] [address]

## 2018-04-20 ENCOUNTER — HOSPITAL ENCOUNTER (OUTPATIENT)
Age: 78
Discharge: HOME | End: 2018-04-20
Payer: MEDICARE

## 2018-04-20 DIAGNOSIS — D69.6 THROMBOCYTOPENIA (HCC): ICD-10-CM

## 2018-04-20 LAB
ABSOLUTE EOS #: 0.19 K/UL (ref 0–0.44)
ABSOLUTE IMMATURE GRANULOCYTE: <0.03 K/UL (ref 0–0.3)
ABSOLUTE LYMPH #: 0.92 K/UL (ref 1.1–3.7)
ABSOLUTE MONO #: 0.58 K/UL (ref 0.1–1.2)
ABSOLUTE RETIC #: 0.05 M/UL (ref 0.03–0.08)
ALBUMIN SERPL-MCNC: 4.1 G/DL (ref 3.5–5.2)
ALBUMIN/GLOBULIN RATIO: 1.7 (ref 1–2.5)
ALP BLD-CCNC: 59 U/L (ref 40–129)
ALT SERPL-CCNC: 20 U/L (ref 5–41)
ANION GAP SERPL CALCULATED.3IONS-SCNC: 14 MMOL/L (ref 9–17)
AST SERPL-CCNC: 22 U/L
BASOPHILS # BLD: 1 % (ref 0–2)
BASOPHILS ABSOLUTE: 0.04 K/UL (ref 0–0.2)
BILIRUB SERPL-MCNC: 1.07 MG/DL (ref 0.3–1.2)
BUN BLDV-MCNC: 21 MG/DL (ref 8–23)
BUN/CREAT BLD: ABNORMAL (ref 9–20)
C-REACTIVE PROTEIN: 0.6 MG/L (ref 0–5)
CALCIUM SERPL-MCNC: 9 MG/DL (ref 8.6–10.4)
CHLORIDE BLD-SCNC: 104 MMOL/L (ref 98–107)
CO2: 26 MMOL/L (ref 20–31)
CREAT SERPL-MCNC: 1.04 MG/DL (ref 0.7–1.2)
DIFFERENTIAL TYPE: ABNORMAL
EOSINOPHILS RELATIVE PERCENT: 4 % (ref 1–4)
FOLATE: >20 NG/ML
GFR AFRICAN AMERICAN: >60 ML/MIN
GFR NON-AFRICAN AMERICAN: >60 ML/MIN
GFR SERPL CREATININE-BSD FRML MDRD: ABNORMAL ML/MIN/{1.73_M2}
GFR SERPL CREATININE-BSD FRML MDRD: ABNORMAL ML/MIN/{1.73_M2}
GLUCOSE BLD-MCNC: 106 MG/DL (ref 70–99)
HCT VFR BLD CALC: 46.8 % (ref 40.7–50.3)
HEMOGLOBIN: 15.2 G/DL (ref 13–17)
IMMATURE GRANULOCYTES: 0 %
IMMATURE RETIC FRACT: 10.9 % (ref 2.7–18.3)
LYMPHOCYTES # BLD: 17 % (ref 24–43)
MCH RBC QN AUTO: 31.5 PG (ref 25.2–33.5)
MCHC RBC AUTO-ENTMCNC: 32.5 G/DL (ref 28.4–34.8)
MCV RBC AUTO: 97.1 FL (ref 82.6–102.9)
MONOCYTES # BLD: 11 % (ref 3–12)
NRBC AUTOMATED: 0 PER 100 WBC
PDW BLD-RTO: 12.5 % (ref 11.8–14.4)
PLATELET # BLD: ABNORMAL K/UL (ref 138–453)
PLATELET ESTIMATE: ABNORMAL
PLATELET, FLUORESCENCE: 135 K/UL (ref 138–453)
PLATELET, IMMATURE FRACTION: 5.7 % (ref 1.1–10.3)
PMV BLD AUTO: ABNORMAL FL (ref 8.1–13.5)
POTASSIUM SERPL-SCNC: 4.8 MMOL/L (ref 3.7–5.3)
RBC # BLD: 4.82 M/UL (ref 4.21–5.77)
RBC # BLD: ABNORMAL 10*6/UL
RETIC %: 0.9 % (ref 0.5–1.9)
RETIC HEMOGLOBIN: 37.5 PG (ref 28.2–35.7)
SEDIMENTATION RATE, ERYTHROCYTE: 3 MM (ref 0–10)
SEG NEUTROPHILS: 67 % (ref 36–65)
SEGMENTED NEUTROPHILS ABSOLUTE COUNT: 3.56 K/UL (ref 1.5–8.1)
SODIUM BLD-SCNC: 144 MMOL/L (ref 135–144)
TOTAL PROTEIN: 6.5 G/DL (ref 6.4–8.3)
VITAMIN B-12: 582 PG/ML (ref 232–1245)
WBC # BLD: 5.3 K/UL (ref 3.5–11.3)
WBC # BLD: ABNORMAL 10*3/UL

## 2018-04-20 PROCEDURE — 86334 IMMUNOFIX E-PHORESIS SERUM: CPT

## 2018-04-20 PROCEDURE — 36415 COLL VENOUS BLD VENIPUNCTURE: CPT

## 2018-04-20 PROCEDURE — 82746 ASSAY OF FOLIC ACID SERUM: CPT

## 2018-04-20 PROCEDURE — 82607 VITAMIN B-12: CPT

## 2018-04-20 PROCEDURE — 84155 ASSAY OF PROTEIN SERUM: CPT

## 2018-04-20 PROCEDURE — 85055 RETICULATED PLATELET ASSAY: CPT

## 2018-04-20 PROCEDURE — 80053 COMPREHEN METABOLIC PANEL: CPT

## 2018-04-20 PROCEDURE — 84165 PROTEIN E-PHORESIS SERUM: CPT

## 2018-04-20 PROCEDURE — 85025 COMPLETE CBC W/AUTO DIFF WBC: CPT

## 2018-04-20 PROCEDURE — 85045 AUTOMATED RETICULOCYTE COUNT: CPT

## 2018-04-20 PROCEDURE — 86140 C-REACTIVE PROTEIN: CPT

## 2018-04-20 PROCEDURE — 85651 RBC SED RATE NONAUTOMATED: CPT

## 2018-04-22 LAB
ALBUMIN (CALCULATED): 4.4 G/DL (ref 3.2–5.2)
ALBUMIN PERCENT: 70 % (ref 45–65)
ALPHA 1 PERCENT: 2 % (ref 3–6)
ALPHA 2 PERCENT: 10 % (ref 6–13)
ALPHA-1-GLOBULIN: 0.2 G/DL (ref 0.1–0.4)
ALPHA-2-GLOBULIN: 0.6 G/DL (ref 0.5–0.9)
BETA GLOBULIN: 0.5 G/DL (ref 0.5–1.1)
BETA PERCENT: 8 % (ref 11–19)
GAMMA GLOBULIN %: 10 % (ref 9–20)
GAMMA GLOBULIN: 0.6 G/DL (ref 0.5–1.5)
PATHOLOGIST: ABNORMAL
PATHOLOGIST: NORMAL
PROTEIN ELECTROPHORESIS, SERUM: ABNORMAL
SERUM IFX INTERP: NORMAL
TOTAL PROT. SUM,%: 100 % (ref 98–102)
TOTAL PROT. SUM: 6.3 G/DL (ref 6.3–8.2)
TOTAL PROTEIN: 6.3 G/DL (ref 6.4–8.3)

## 2018-04-23 LAB
PATHOLOGIST REVIEW: NORMAL
SURGICAL PATHOLOGY REPORT: NORMAL

## 2018-04-25 ENCOUNTER — OFFICE VISIT (OUTPATIENT)
Dept: ONCOLOGY | Age: 78
End: 2018-04-25
Payer: MEDICARE

## 2018-04-25 VITALS
BODY MASS INDEX: 29.2 KG/M2 | TEMPERATURE: 97.4 F | DIASTOLIC BLOOD PRESSURE: 90 MMHG | WEIGHT: 203.5 LBS | RESPIRATION RATE: 18 BRPM | HEART RATE: 52 BPM | SYSTOLIC BLOOD PRESSURE: 152 MMHG

## 2018-04-25 DIAGNOSIS — D69.6 THROMBOCYTOPENIA (HCC): Primary | ICD-10-CM

## 2018-04-25 PROCEDURE — 99211 OFF/OP EST MAY X REQ PHY/QHP: CPT

## 2018-04-25 PROCEDURE — 99213 OFFICE O/P EST LOW 20 MIN: CPT | Performed by: INTERNAL MEDICINE

## 2018-10-01 DIAGNOSIS — I10 ESSENTIAL HYPERTENSION: Chronic | ICD-10-CM

## 2018-10-01 RX ORDER — ATORVASTATIN CALCIUM 10 MG/1
TABLET, FILM COATED ORAL
Qty: 90 TABLET | Refills: 3 | Status: SHIPPED | OUTPATIENT
Start: 2018-10-01 | End: 2019-09-23 | Stop reason: SDUPTHER

## 2018-10-01 RX ORDER — ATENOLOL 50 MG/1
TABLET ORAL
Qty: 90 TABLET | Refills: 3 | Status: SHIPPED | OUTPATIENT
Start: 2018-10-01 | End: 2019-09-23 | Stop reason: SDUPTHER

## 2018-10-24 ENCOUNTER — OFFICE VISIT (OUTPATIENT)
Dept: ONCOLOGY | Age: 78
End: 2018-10-24
Payer: MEDICARE

## 2018-10-24 ENCOUNTER — HOSPITAL ENCOUNTER (OUTPATIENT)
Age: 78
Discharge: HOME | End: 2018-10-24
Payer: MEDICARE

## 2018-10-24 VITALS
BODY MASS INDEX: 27.18 KG/M2 | TEMPERATURE: 97.6 F | DIASTOLIC BLOOD PRESSURE: 85 MMHG | SYSTOLIC BLOOD PRESSURE: 142 MMHG | WEIGHT: 189.4 LBS | HEART RATE: 59 BPM

## 2018-10-24 DIAGNOSIS — Z85.828 HISTORY OF BASAL CELL CANCER: ICD-10-CM

## 2018-10-24 DIAGNOSIS — D69.6 THROMBOCYTOPENIA (HCC): ICD-10-CM

## 2018-10-24 DIAGNOSIS — D69.6 THROMBOCYTOPENIA (HCC): Primary | ICD-10-CM

## 2018-10-24 LAB
ABSOLUTE EOS #: 0.1 K/UL (ref 0–0.4)
ABSOLUTE IMMATURE GRANULOCYTE: ABNORMAL K/UL (ref 0–0.3)
ABSOLUTE LYMPH #: 0.8 K/UL (ref 1–4.8)
ABSOLUTE MONO #: 0.5 K/UL (ref 0.1–1.2)
BASOPHILS # BLD: 1 % (ref 0–2)
BASOPHILS ABSOLUTE: 0 K/UL (ref 0–0.2)
DIFFERENTIAL TYPE: ABNORMAL
EOSINOPHILS RELATIVE PERCENT: 3 % (ref 1–4)
HCT VFR BLD CALC: 47.6 % (ref 41–53)
HEMOGLOBIN: 15.8 G/DL (ref 13.5–17.5)
IMMATURE GRANULOCYTES: ABNORMAL %
LYMPHOCYTES # BLD: 18 % (ref 24–44)
MCH RBC QN AUTO: 32.1 PG (ref 26–34)
MCHC RBC AUTO-ENTMCNC: 33.2 G/DL (ref 31–37)
MCV RBC AUTO: 96.7 FL (ref 80–100)
MONOCYTES # BLD: 10 % (ref 2–11)
NRBC AUTOMATED: ABNORMAL PER 100 WBC
PDW BLD-RTO: 13.3 % (ref 12.5–15.4)
PLATELET # BLD: 137 K/UL (ref 140–450)
PLATELET ESTIMATE: ABNORMAL
PMV BLD AUTO: 8.8 FL (ref 6–12)
RBC # BLD: 4.92 M/UL (ref 4.5–5.9)
RBC # BLD: ABNORMAL 10*6/UL
SEG NEUTROPHILS: 68 % (ref 36–66)
SEGMENTED NEUTROPHILS ABSOLUTE COUNT: 3.3 K/UL (ref 1.8–7.7)
WBC # BLD: 4.8 K/UL (ref 3.5–11)
WBC # BLD: ABNORMAL 10*3/UL

## 2018-10-24 PROCEDURE — 99211 OFF/OP EST MAY X REQ PHY/QHP: CPT | Performed by: INTERNAL MEDICINE

## 2018-10-24 PROCEDURE — 36415 COLL VENOUS BLD VENIPUNCTURE: CPT

## 2018-10-24 PROCEDURE — 99213 OFFICE O/P EST LOW 20 MIN: CPT | Performed by: INTERNAL MEDICINE

## 2018-10-24 PROCEDURE — 85025 COMPLETE CBC W/AUTO DIFF WBC: CPT

## 2018-10-28 NOTE — PROGRESS NOTES
PROGRESS NOTE    PCP: BOBBY White - CNP  Referring Provider: No ref. provider found    VISIT DIAGNOSIS:  The primary encounter diagnosis was Thrombocytopenia ([facility] A diagnosis of History of basal cell cancer was also pertinent to this visit. PAST MEDICAL HISTORY:      Diagnosis Date    Cancer ([facility] [address] [facility]     basal cell    Diverticulosis     coli    Plantar fasciitis        PAST SURGICAL HISTORY:      Procedure Laterality Date    CATARACT REMOVAL WITH IMPLANT Bilateral may 2013    COLONOSCOPY      HERNIA REPAIR      SKIN CANCER EXCISION      multi sites       CURRENT MEDICATIONS:   Current Outpatient Prescriptions   Medication Sig Dispense Refill    atorvastatin (LIPITOR) 10 MG tablet TAKE ONE TABLET BY MOUTH ONCE DAILY 90 tablet 3    atenolol (TENORMIN) 50 MG tablet TAKE ONE TABLET BY MOUTH ONCE DAILY 90 tablet 3    lisinopril (PRINIVIL;ZESTRIL) 20 MG tablet Take 2 tablets by mouth daily 180 tablet 3    clobetasol (TEMOVATE) 0.05 % external solution       cimetidine (TAGAMET HB) 200 MG tablet Take 200 mg by mouth 2 times daily      diphenhydrAMINE (BENADRYL) 25 MG capsule Take 25 mg by mouth every 6 hours as needed for Itching      cetirizine (ZYRTEC) 10 MG tablet Take 10 mg by mouth daily      halobetasol (ULTRAVATE) 0.05 % ointment       Multiple Vitamins-Minerals (THERAPEUTIC MULTIVITAMIN-MINERALS) tablet Take 1 tablet by mouth daily.  aspirin 81 MG tablet Take  by mouth daily. No current facility-administered medications for this visit. SUBJECTIVE:  Evan Gutiérrez is a very pleasant 77 y.o. male who is Presenting for follow-up. He was referred to me in December 2017 for evaluation of thrombocytopenia. On review of old testing, it appears the thrombocytopenia is quite chronic for many years.     Since I last saw him, he continues to do very well. He is not reporting any new bruising or  Bleeding. His energy and performance status are excellent.  He is not having any chest pain or shortness of breath. He is in his normal state of health. He would like to review his test results. Otherwise, he has no complaints. He reports no changes in his family history or social history. PHYSICAL EXAM:   Vitals:    10/24/18 1324   BP: (!) 142/85   Pulse: 59   Temp: 97.6 °F (36.4 °C)       Physical Exam   Constitutional: He is oriented to person, place, and time. He appears well-developed and well-nourished. No distress. HENT:   Head: Normocephalic and atraumatic. Eyes: Pupils are equal, round, and reactive to light. Neck: Neck supple. Cardiovascular: Normal rate, regular rhythm and normal heart sounds. No murmur heard. Pulmonary/Chest: Effort normal and breath sounds normal. No stridor. No respiratory distress. He has no wheezes. Abdominal: Soft. Bowel sounds are normal. He exhibits no distension. There is no tenderness. Musculoskeletal: Normal range of motion. He exhibits no edema. Neurological: He is alert and oriented to person, place, and time. No cranial nerve deficit. Skin: Skin is warm and dry. No rash noted. Psychiatric: He has a normal mood and affect. His behavior is normal.   Nursing note and vitals reviewed.       LABS:   Results for orders placed or performed during the hospital encounter of 10/24/18   CBC Auto Differential   Result Value Ref Range    WBC 4.8 3.5 - 11.0 k/uL    RBC 4.92 4.5 - 5.9 m/uL    Hemoglobin 15.8 13.5 - 17.5 g/dL    Hematocrit 47.6 41 - 53 %    MCV 96.7 80 - 100 fL    MCH 32.1 26 - 34 pg    MCHC 33.2 31 - 37 g/dL    RDW 13.3 12.5 - 15.4 %    Platelets 289 (L) 435 - 450 k/uL    MPV 8.8 6.0 - 12.0 fL    NRBC Automated NOT REPORTED per 100 WBC    Differential Type NOT REPORTED     Seg Neutrophils 68 (H) 36 - 66 %    Lymphocytes 18 (L) 24 - 44 %    Monocytes 10 2 - 11 %    Eosinophils % 3 1 - 4 %    Basophils 1 0 - 2 %    Immature Granulocytes NOT REPORTED 0 %    Segs Absolute 3.30 1.8 - 7.7 k/uL    Absolute Lymph # 0.80 (L) 1.0 - 4.8

## 2018-10-29 ENCOUNTER — OFFICE VISIT (OUTPATIENT)
Dept: PRIMARY CARE CLINIC | Age: 78
End: 2018-10-29
Payer: MEDICARE

## 2018-10-29 VITALS
SYSTOLIC BLOOD PRESSURE: 124 MMHG | BODY MASS INDEX: 26.74 KG/M2 | HEART RATE: 53 BPM | RESPIRATION RATE: 15 BRPM | OXYGEN SATURATION: 97 % | HEIGHT: 70 IN | DIASTOLIC BLOOD PRESSURE: 82 MMHG | WEIGHT: 186.8 LBS

## 2018-10-29 DIAGNOSIS — L81.9 DISCOLORATION OF SKIN OF LOWER LEG: ICD-10-CM

## 2018-10-29 DIAGNOSIS — E78.5 DYSLIPIDEMIA: Chronic | ICD-10-CM

## 2018-10-29 DIAGNOSIS — I10 ESSENTIAL HYPERTENSION: Primary | Chronic | ICD-10-CM

## 2018-10-29 DIAGNOSIS — Z12.5 SCREENING FOR PROSTATE CANCER: ICD-10-CM

## 2018-10-29 PROCEDURE — 4040F PNEUMOC VAC/ADMIN/RCVD: CPT | Performed by: NURSE PRACTITIONER

## 2018-10-29 PROCEDURE — G8417 CALC BMI ABV UP PARAM F/U: HCPCS | Performed by: NURSE PRACTITIONER

## 2018-10-29 PROCEDURE — 1123F ACP DISCUSS/DSCN MKR DOCD: CPT | Performed by: NURSE PRACTITIONER

## 2018-10-29 PROCEDURE — 99214 OFFICE O/P EST MOD 30 MIN: CPT | Performed by: NURSE PRACTITIONER

## 2018-10-29 PROCEDURE — G8484 FLU IMMUNIZE NO ADMIN: HCPCS | Performed by: NURSE PRACTITIONER

## 2018-10-29 PROCEDURE — G8427 DOCREV CUR MEDS BY ELIG CLIN: HCPCS | Performed by: NURSE PRACTITIONER

## 2018-10-29 PROCEDURE — 1036F TOBACCO NON-USER: CPT | Performed by: NURSE PRACTITIONER

## 2018-10-29 PROCEDURE — 1101F PT FALLS ASSESS-DOCD LE1/YR: CPT | Performed by: NURSE PRACTITIONER

## 2018-10-29 ASSESSMENT — PATIENT HEALTH QUESTIONNAIRE - PHQ9
1. LITTLE INTEREST OR PLEASURE IN DOING THINGS: 0
SUM OF ALL RESPONSES TO PHQ QUESTIONS 1-9: 0
SUM OF ALL RESPONSES TO PHQ9 QUESTIONS 1 & 2: 0
2. FEELING DOWN, DEPRESSED OR HOPELESS: 0
SUM OF ALL RESPONSES TO PHQ QUESTIONS 1-9: 0

## 2018-10-29 ASSESSMENT — ENCOUNTER SYMPTOMS
SHORTNESS OF BREATH: 0
COUGH: 0
ORTHOPNEA: 0
BLURRED VISION: 0
ABDOMINAL PAIN: 0
BACK PAIN: 0

## 2018-10-29 NOTE — PROGRESS NOTES
visual disturbance. Respiratory: Negative for cough and shortness of breath. Cardiovascular: Negative for chest pain, palpitations, orthopnea and PND. Wearing bianca hose  Discoloration to bilateral lower extremities   Gastrointestinal: Negative for abdominal pain. Genitourinary: Negative for difficulty urinating and dysuria. Musculoskeletal: Negative for arthralgias, back pain and neck pain. Neurological: Negative for dizziness and headaches. Psychiatric/Behavioral: Negative for self-injury, sleep disturbance and suicidal ideas. The patient is not nervous/anxious. Objective:     Physical Exam   Constitutional: He is oriented to person, place, and time. He appears well-developed and well-nourished. HENT:   Head: Normocephalic and atraumatic. Eyes: Pupils are equal, round, and reactive to light. Neck: Normal range of motion. Cardiovascular: Normal rate, regular rhythm and normal heart sounds. Pulmonary/Chest: Effort normal and breath sounds normal.   Abdominal: Soft. Bowel sounds are normal. There is no tenderness. Musculoskeletal: Normal range of motion. Neurological: He is alert and oriented to person, place, and time. Skin: Skin is warm and dry. Psychiatric: He has a normal mood and affect. His behavior is normal. Judgment and thought content normal.   Nursing note and vitals reviewed. /82   Pulse 53   Resp 15   Ht 5' 10\" (1.778 m)   Wt 186 lb 12.8 oz (84.7 kg)   SpO2 97%   BMI 26.80 kg/m²     Assessment:       Diagnosis Orders   1. Essential hypertension     2. Dyslipidemia  Lipid Panel   3. Screening for prostate cancer  Psa screening   4. Discoloration of skin of lower leg  [facility]- Negro Singh MD             Plan:      Return in about 6 months (around 4/29/2019) for hypertension. 1. HTN- Stable. Continue current meds. Follow up in six months for recheck. 2. Health maintenance- Rx given for annual labs.  Follow up in six months

## 2019-05-02 ENCOUNTER — OFFICE VISIT (OUTPATIENT)
Dept: PRIMARY CARE CLINIC | Age: 79
End: 2019-05-02
Payer: MEDICARE

## 2019-05-02 VITALS
OXYGEN SATURATION: 98 % | HEIGHT: 71 IN | BODY MASS INDEX: 25.98 KG/M2 | WEIGHT: 185.6 LBS | DIASTOLIC BLOOD PRESSURE: 88 MMHG | RESPIRATION RATE: 16 BRPM | SYSTOLIC BLOOD PRESSURE: 132 MMHG | HEART RATE: 56 BPM

## 2019-05-02 DIAGNOSIS — D69.6 THROMBOCYTOPENIA (HCC): ICD-10-CM

## 2019-05-02 DIAGNOSIS — I10 ESSENTIAL HYPERTENSION: Chronic | ICD-10-CM

## 2019-05-02 PROCEDURE — 99214 OFFICE O/P EST MOD 30 MIN: CPT | Performed by: NURSE PRACTITIONER

## 2019-05-02 PROCEDURE — G8417 CALC BMI ABV UP PARAM F/U: HCPCS | Performed by: NURSE PRACTITIONER

## 2019-05-02 PROCEDURE — G8427 DOCREV CUR MEDS BY ELIG CLIN: HCPCS | Performed by: NURSE PRACTITIONER

## 2019-05-02 PROCEDURE — 1123F ACP DISCUSS/DSCN MKR DOCD: CPT | Performed by: NURSE PRACTITIONER

## 2019-05-02 PROCEDURE — 1036F TOBACCO NON-USER: CPT | Performed by: NURSE PRACTITIONER

## 2019-05-02 PROCEDURE — 4040F PNEUMOC VAC/ADMIN/RCVD: CPT | Performed by: NURSE PRACTITIONER

## 2019-05-02 RX ORDER — LISINOPRIL 20 MG/1
40 TABLET ORAL DAILY
Qty: 180 TABLET | Refills: 3 | Status: SHIPPED | OUTPATIENT
Start: 2019-05-02 | End: 2020-02-26 | Stop reason: SDUPTHER

## 2019-05-02 ASSESSMENT — ENCOUNTER SYMPTOMS
COUGH: 0
SHORTNESS OF BREATH: 0
ABDOMINAL PAIN: 0
BACK PAIN: 0

## 2019-05-02 ASSESSMENT — PATIENT HEALTH QUESTIONNAIRE - PHQ9
1. LITTLE INTEREST OR PLEASURE IN DOING THINGS: 0
SUM OF ALL RESPONSES TO PHQ QUESTIONS 1-9: 0
2. FEELING DOWN, DEPRESSED OR HOPELESS: 0
SUM OF ALL RESPONSES TO PHQ QUESTIONS 1-9: 0
SUM OF ALL RESPONSES TO PHQ9 QUESTIONS 1 & 2: 0

## 2019-05-02 NOTE — PROGRESS NOTES
Comment: 1 drink daily      Current Outpatient Medications   Medication Sig Dispense Refill    lisinopril (PRINIVIL;ZESTRIL) 20 MG tablet Take 2 tablets by mouth daily 180 tablet 3    atorvastatin (LIPITOR) 10 MG tablet TAKE ONE TABLET BY MOUTH ONCE DAILY 90 tablet 3    atenolol (TENORMIN) 50 MG tablet TAKE ONE TABLET BY MOUTH ONCE DAILY 90 tablet 3    Multiple Vitamins-Minerals (THERAPEUTIC MULTIVITAMIN-MINERALS) tablet Take 1 tablet by mouth daily.  aspirin 81 MG tablet Take  by mouth daily.  clobetasol (TEMOVATE) 0.05 % external solution       cimetidine (TAGAMET HB) 200 MG tablet Take 200 mg by mouth 2 times daily      diphenhydrAMINE (BENADRYL) 25 MG capsule Take 25 mg by mouth every 6 hours as needed for Itching      cetirizine (ZYRTEC) 10 MG tablet Take 10 mg by mouth daily      halobetasol (ULTRAVATE) 0.05 % ointment        No current facility-administered medications for this visit. Allergies   Allergen Reactions    Sulfa Antibiotics        Health Maintenance   Topic Date Due    Potassium monitoring  04/20/2019    Creatinine monitoring  04/20/2019    Shingles Vaccine (2 of 2) 05/04/2019    DTaP/Tdap/Td vaccine (2 - Td) 11/15/2026    Flu vaccine  Completed    Pneumococcal 65+ years Vaccine  Completed       Subjective:      Review of Systems   Constitutional: Negative for chills, fatigue and fever. HENT: Negative for congestion. Eyes: Negative for visual disturbance. Respiratory: Negative for cough and shortness of breath. Cardiovascular: Negative for chest pain and palpitations. Gastrointestinal: Negative for abdominal pain. Genitourinary: Negative for difficulty urinating and dysuria. Musculoskeletal: Negative for arthralgias and back pain. Neurological: Negative for dizziness and headaches. Psychiatric/Behavioral: Negative for self-injury, sleep disturbance and suicidal ideas. The patient is not nervous/anxious.         Objective:     Physical Exam Constitutional: He is oriented to person, place, and time. He appears well-developed and well-nourished. HENT:   Head: Normocephalic and atraumatic. Eyes: Pupils are equal, round, and reactive to light. Neck: Normal range of motion. Cardiovascular: Normal rate, regular rhythm and normal heart sounds. Pulmonary/Chest: Effort normal and breath sounds normal.   Abdominal: Soft. Bowel sounds are normal. There is no tenderness. Musculoskeletal: Normal range of motion. Neurological: He is alert and oriented to person, place, and time. Skin: Skin is warm and dry. Psychiatric: He has a normal mood and affect. His behavior is normal. Judgment and thought content normal.   Nursing note and vitals reviewed. /88   Pulse 56   Resp 16   Ht 5' 10.5\" (1.791 m)   Wt 185 lb 9.6 oz (84.2 kg)   SpO2 98%   BMI 26.25 kg/m²     Assessment:       Diagnosis Orders   1. Essential hypertension  lisinopril (PRINIVIL;ZESTRIL) 20 MG tablet             Plan:      Return in about 6 months (around 11/2/2019) for annual exam.    1. HTN- Stable. Continue current meds. Follow up in six months for recheck/annual exam.    Of the 25 minute duration appointment visit, Leanna BRINK-BC spent at least 50% of the face-to-face time in counseling, explanation of diagnosis, planning of further management, and answering all questions. Orders Placed This Encounter   Medications    lisinopril (PRINIVIL;ZESTRIL) 20 MG tablet     Sig: Take 2 tablets by mouth daily     Dispense:  180 tablet     Refill:  3       Patient given educational materials - see patient instructions. Discussed use, benefit, and side effects of prescribed medications. All patientquestions answered. Pt voiced understanding. Reviewed health maintenance. Instructedto continue current medications, diet and exercise. Patient agreed with treatmentplan. Follow up as directed.      Electronicallysigned by BOBBY Blas CNP on 5/2/2019 at 3:03 PM

## 2019-06-03 ENCOUNTER — HOSPITAL ENCOUNTER (OUTPATIENT)
Age: 79
Discharge: HOME | End: 2019-06-03
Payer: MEDICARE

## 2019-06-03 DIAGNOSIS — Z12.5 SCREENING FOR PROSTATE CANCER: ICD-10-CM

## 2019-06-03 DIAGNOSIS — E78.5 DYSLIPIDEMIA: Chronic | ICD-10-CM

## 2019-06-03 LAB
CHOLESTEROL/HDL RATIO: 2.9
CHOLESTEROL: 150 MG/DL
HDLC SERPL-MCNC: 52 MG/DL
LDL CHOLESTEROL: 88 MG/DL (ref 0–130)
PROSTATE SPECIFIC ANTIGEN: 3.33 UG/L
TRIGL SERPL-MCNC: 51 MG/DL
VLDLC SERPL CALC-MCNC: NORMAL MG/DL (ref 1–30)

## 2019-06-03 PROCEDURE — G0103 PSA SCREENING: HCPCS

## 2019-06-03 PROCEDURE — 80061 LIPID PANEL: CPT

## 2019-06-03 PROCEDURE — 36415 COLL VENOUS BLD VENIPUNCTURE: CPT

## 2019-06-05 ENCOUNTER — OFFICE VISIT (OUTPATIENT)
Dept: ONCOLOGY | Age: 79
End: 2019-06-05
Payer: MEDICARE

## 2019-06-05 ENCOUNTER — HOSPITAL ENCOUNTER (OUTPATIENT)
Age: 79
Discharge: HOME | End: 2019-06-05
Payer: MEDICARE

## 2019-06-05 VITALS
HEART RATE: 58 BPM | SYSTOLIC BLOOD PRESSURE: 142 MMHG | TEMPERATURE: 97.7 F | DIASTOLIC BLOOD PRESSURE: 81 MMHG | BODY MASS INDEX: 26.58 KG/M2 | WEIGHT: 187.9 LBS

## 2019-06-05 DIAGNOSIS — Z85.828 HISTORY OF BASAL CELL CANCER: ICD-10-CM

## 2019-06-05 DIAGNOSIS — D69.6 THROMBOCYTOPENIA (HCC): Primary | ICD-10-CM

## 2019-06-05 DIAGNOSIS — D69.6 THROMBOCYTOPENIA (HCC): ICD-10-CM

## 2019-06-05 LAB
ABSOLUTE EOS #: 0.2 K/UL (ref 0–0.4)
ABSOLUTE IMMATURE GRANULOCYTE: ABNORMAL K/UL (ref 0–0.3)
ABSOLUTE LYMPH #: 0.9 K/UL (ref 1–4.8)
ABSOLUTE MONO #: 0.4 K/UL (ref 0.1–1.2)
BASOPHILS # BLD: 1 % (ref 0–2)
BASOPHILS ABSOLUTE: 0 K/UL (ref 0–0.2)
DIFFERENTIAL TYPE: ABNORMAL
EOSINOPHILS RELATIVE PERCENT: 5 % (ref 1–4)
HCT VFR BLD CALC: 45.3 % (ref 41–53)
HEMOGLOBIN: 15.5 G/DL (ref 13.5–17.5)
IMMATURE GRANULOCYTES: ABNORMAL %
LYMPHOCYTES # BLD: 21 % (ref 24–44)
MCH RBC QN AUTO: 32.6 PG (ref 26–34)
MCHC RBC AUTO-ENTMCNC: 34.2 G/DL (ref 31–37)
MCV RBC AUTO: 95.4 FL (ref 80–100)
MONOCYTES # BLD: 9 % (ref 2–11)
NRBC AUTOMATED: ABNORMAL PER 100 WBC
PDW BLD-RTO: 13.6 % (ref 12.5–15.4)
PLATELET # BLD: 139 K/UL (ref 140–450)
PLATELET ESTIMATE: ABNORMAL
PMV BLD AUTO: 8.3 FL (ref 6–12)
RBC # BLD: 4.75 M/UL (ref 4.5–5.9)
RBC # BLD: ABNORMAL 10*6/UL
SEG NEUTROPHILS: 64 % (ref 36–66)
SEGMENTED NEUTROPHILS ABSOLUTE COUNT: 2.8 K/UL (ref 1.8–7.7)
WBC # BLD: 4.4 K/UL (ref 3.5–11)
WBC # BLD: ABNORMAL 10*3/UL

## 2019-06-05 PROCEDURE — 99211 OFF/OP EST MAY X REQ PHY/QHP: CPT | Performed by: INTERNAL MEDICINE

## 2019-06-05 PROCEDURE — 85025 COMPLETE CBC W/AUTO DIFF WBC: CPT

## 2019-06-05 PROCEDURE — 36415 COLL VENOUS BLD VENIPUNCTURE: CPT

## 2019-06-05 PROCEDURE — G8417 CALC BMI ABV UP PARAM F/U: HCPCS | Performed by: INTERNAL MEDICINE

## 2019-06-05 PROCEDURE — 4040F PNEUMOC VAC/ADMIN/RCVD: CPT | Performed by: INTERNAL MEDICINE

## 2019-06-05 PROCEDURE — 99213 OFFICE O/P EST LOW 20 MIN: CPT | Performed by: INTERNAL MEDICINE

## 2019-06-05 PROCEDURE — G8427 DOCREV CUR MEDS BY ELIG CLIN: HCPCS | Performed by: INTERNAL MEDICINE

## 2019-06-05 PROCEDURE — 1036F TOBACCO NON-USER: CPT | Performed by: INTERNAL MEDICINE

## 2019-06-05 PROCEDURE — 1123F ACP DISCUSS/DSCN MKR DOCD: CPT | Performed by: INTERNAL MEDICINE

## 2019-06-05 NOTE — PROGRESS NOTES
IMPRESSION:     1. Thrombocytopenia ([facility] [address]    2. History of basal cell cancer        Patient Active Problem List   Diagnosis    HTN (hypertension)    Dyslipidemia    GERD (gastroesophageal reflux disease)    Diverticulosis of colon    History of basal cell cancer    Thrombocytopenia ([facility]       PLAN:     1. The patient returns for follow-up. He is being treated with active surveillance for his chronic from cytopenia. His stoma cytopenias appears to be present since at least 2013 and possibly even earlier than that. 2. I reviewed his CBC from today. His platelet count remains low but stable. It's greater than 130. His platelet trend does seem to wax and wane a little bit. However, the overall trend is quite stable for the last 6+ years. 3. At this point, our plan is the proceed with no additional investigation beyond observation and watchful waiting. I recommend a repeat CBC every 6 months for surveillance. He is agreeable. 4. If he experiences any new bruising or bleeding or other worrisome symptoms, she'll seek medical attention right away. Otherwise, our plan is to reconvene in 12 months or sooner if clinically indicated.       Electronically signed by Desire Barbosa MD on 6/5/2019 at 10:53 AM

## 2019-09-23 DIAGNOSIS — I10 ESSENTIAL HYPERTENSION: Chronic | ICD-10-CM

## 2019-09-23 RX ORDER — ATORVASTATIN CALCIUM 10 MG/1
TABLET, FILM COATED ORAL
Qty: 90 TABLET | Refills: 3 | Status: SHIPPED | OUTPATIENT
Start: 2019-09-23 | End: 2020-02-26 | Stop reason: SDUPTHER

## 2019-09-23 RX ORDER — ATENOLOL 50 MG/1
TABLET ORAL
Qty: 90 TABLET | Refills: 3 | Status: SHIPPED | OUTPATIENT
Start: 2019-09-23 | End: 2019-10-31 | Stop reason: ALTCHOICE

## 2019-09-23 NOTE — TELEPHONE ENCOUNTER
Last OV 5/2/19    Health Maintenance   Topic Date Due    Potassium monitoring  04/20/2019    Creatinine monitoring  04/20/2019    Shingles Vaccine (2 of 2) 05/04/2019    Annual Wellness Visit (AWV)  05/29/2019    DTaP/Tdap/Td vaccine (2 - Td) 11/15/2026    Flu vaccine  Completed    Pneumococcal 65+ years Vaccine  Completed             (applicable per patient's age: Cancer Screenings, Depression Screening, Fall Risk Screening, Immunizations)    LDL Cholesterol (mg/dL)   Date Value   06/03/2019 88     LDL Calculated (mg/dL)   Date Value   03/14/2013 86     AST (U/L)   Date Value   04/20/2018 22     ALT (U/L)   Date Value   04/20/2018 20     BUN (mg/dL)   Date Value   04/20/2018 21      (goal A1C is < 7)   (goal LDL is <100) need 30-50% reduction from baseline     BP Readings from Last 3 Encounters:   06/05/19 (!) 142/81   05/02/19 132/88   10/29/18 124/82    (goal /80)      All Future Testing planned in [facility]:  Lab Frequency Next Occurrence   CBC Auto Differential Once 12/05/2019   CBC Auto Differential Once 06/05/2020       Next Visit Date:  Future Appointments   Date Time Provider Patricia Marrero   10/31/2019  1:40 PM BOBBY Damian - CNP [facility] [address] [facility]            Patient Active Problem List:     HTN (hypertension)     Dyslipidemia     GERD (gastroesophageal reflux disease)     Diverticulosis of colon     History of basal cell cancer     Thrombocytopenia ([facility] [address]

## 2019-10-31 ENCOUNTER — OFFICE VISIT (OUTPATIENT)
Dept: PRIMARY CARE CLINIC | Age: 79
End: 2019-10-31
Payer: MEDICARE

## 2019-10-31 VITALS
RESPIRATION RATE: 15 BRPM | OXYGEN SATURATION: 95 % | DIASTOLIC BLOOD PRESSURE: 80 MMHG | SYSTOLIC BLOOD PRESSURE: 112 MMHG | HEIGHT: 71 IN | BODY MASS INDEX: 26.57 KG/M2 | HEART RATE: 45 BPM | WEIGHT: 189.8 LBS

## 2019-10-31 DIAGNOSIS — Z00.00 ENCOUNTER FOR GENERAL ADULT MEDICAL EXAMINATION W/O ABNORMAL FINDINGS: Primary | ICD-10-CM

## 2019-10-31 DIAGNOSIS — I10 ESSENTIAL HYPERTENSION: Chronic | ICD-10-CM

## 2019-10-31 PROCEDURE — 1123F ACP DISCUSS/DSCN MKR DOCD: CPT | Performed by: NURSE PRACTITIONER

## 2019-10-31 PROCEDURE — 4040F PNEUMOC VAC/ADMIN/RCVD: CPT | Performed by: NURSE PRACTITIONER

## 2019-10-31 PROCEDURE — G8482 FLU IMMUNIZE ORDER/ADMIN: HCPCS | Performed by: NURSE PRACTITIONER

## 2019-10-31 PROCEDURE — G0438 PPPS, INITIAL VISIT: HCPCS | Performed by: NURSE PRACTITIONER

## 2019-10-31 RX ORDER — ATENOLOL 25 MG/1
TABLET ORAL
Qty: 90 TABLET | Refills: 0 | Status: SHIPPED | OUTPATIENT
Start: 2019-10-31 | End: 2020-01-22

## 2019-10-31 ASSESSMENT — PATIENT HEALTH QUESTIONNAIRE - PHQ9
2. FEELING DOWN, DEPRESSED OR HOPELESS: 0
SUM OF ALL RESPONSES TO PHQ QUESTIONS 1-9: 0
SUM OF ALL RESPONSES TO PHQ9 QUESTIONS 1 & 2: 0
1. LITTLE INTEREST OR PLEASURE IN DOING THINGS: 0

## 2019-10-31 ASSESSMENT — LIFESTYLE VARIABLES
HAVE YOU OR SOMEONE ELSE BEEN INJURED AS A RESULT OF YOUR DRINKING: 0
AUDIT TOTAL SCORE: 4
HOW MANY STANDARD DRINKS CONTAINING ALCOHOL DO YOU HAVE ON A TYPICAL DAY: 0
AUDIT-C TOTAL SCORE: 4
HOW OFTEN DURING THE LAST YEAR HAVE YOU NEEDED AN ALCOHOLIC DRINK FIRST THING IN THE MORNING TO GET YOURSELF GOING AFTER A NIGHT OF HEAVY DRINKING: 0
HOW OFTEN DURING THE LAST YEAR HAVE YOU BEEN UNABLE TO REMEMBER WHAT HAPPENED THE NIGHT BEFORE BECAUSE YOU HAD BEEN DRINKING: 0
HAS A RELATIVE, FRIEND, DOCTOR, OR ANOTHER HEALTH PROFESSIONAL EXPRESSED CONCERN ABOUT YOUR DRINKING OR SUGGESTED YOU CUT DOWN: 0
HOW OFTEN DURING THE LAST YEAR HAVE YOU HAD A FEELING OF GUILT OR REMORSE AFTER DRINKING: 0
HOW OFTEN DO YOU HAVE A DRINK CONTAINING ALCOHOL: 4
HOW OFTEN DURING THE LAST YEAR HAVE YOU FAILED TO DO WHAT WAS NORMALLY EXPECTED FROM YOU BECAUSE OF DRINKING: 0
HOW OFTEN DURING THE LAST YEAR HAVE YOU FOUND THAT YOU WERE NOT ABLE TO STOP DRINKING ONCE YOU HAD STARTED: 0
HOW OFTEN DO YOU HAVE SIX OR MORE DRINKS ON ONE OCCASION: 0

## 2020-01-17 ENCOUNTER — OFFICE VISIT (OUTPATIENT)
Dept: FAMILY MEDICINE CLINIC | Age: 80
End: 2020-01-17
Payer: MEDICARE

## 2020-01-17 ENCOUNTER — TELEPHONE (OUTPATIENT)
Dept: PRIMARY CARE CLINIC | Age: 80
End: 2020-01-17

## 2020-01-17 VITALS
BODY MASS INDEX: 27.16 KG/M2 | HEIGHT: 71 IN | DIASTOLIC BLOOD PRESSURE: 76 MMHG | RESPIRATION RATE: 18 BRPM | HEART RATE: 52 BPM | WEIGHT: 194 LBS | SYSTOLIC BLOOD PRESSURE: 148 MMHG

## 2020-01-17 PROCEDURE — 1036F TOBACCO NON-USER: CPT | Performed by: PHYSICIAN ASSISTANT

## 2020-01-17 PROCEDURE — 99213 OFFICE O/P EST LOW 20 MIN: CPT | Performed by: PHYSICIAN ASSISTANT

## 2020-01-17 PROCEDURE — 1123F ACP DISCUSS/DSCN MKR DOCD: CPT | Performed by: PHYSICIAN ASSISTANT

## 2020-01-17 PROCEDURE — 4040F PNEUMOC VAC/ADMIN/RCVD: CPT | Performed by: PHYSICIAN ASSISTANT

## 2020-01-17 PROCEDURE — G8417 CALC BMI ABV UP PARAM F/U: HCPCS | Performed by: PHYSICIAN ASSISTANT

## 2020-01-17 PROCEDURE — G8427 DOCREV CUR MEDS BY ELIG CLIN: HCPCS | Performed by: PHYSICIAN ASSISTANT

## 2020-01-17 PROCEDURE — G8482 FLU IMMUNIZE ORDER/ADMIN: HCPCS | Performed by: PHYSICIAN ASSISTANT

## 2020-01-17 RX ORDER — LOPERAMIDE HYDROCHLORIDE 2 MG/1
2 CAPSULE ORAL 4 TIMES DAILY PRN
Qty: 40 CAPSULE | Refills: 0 | Status: SHIPPED | OUTPATIENT
Start: 2020-01-17 | End: 2020-01-27 | Stop reason: ALTCHOICE

## 2020-01-17 ASSESSMENT — ENCOUNTER SYMPTOMS
BLOOD IN STOOL: 0
CONSTIPATION: 0
ANAL BLEEDING: 0
COUGH: 0
VOMITING: 0
DIARRHEA: 1
FLATUS: 0
EYES NEGATIVE: 1
ABDOMINAL DISTENTION: 0
BLOATING: 0
NAUSEA: 0
RECTAL PAIN: 0
ABDOMINAL PAIN: 0

## 2020-01-17 NOTE — PATIENT INSTRUCTIONS
Patient Education        Diarrhea: Care Instructions  Your Care Instructions    Diarrhea is loose, watery stools (bowel movements). The exact cause is often hard to find. Sometimes diarrhea is your body's way of getting rid of what caused an upset stomach. Viruses, food poisoning, and many medicines can cause diarrhea. Some people get diarrhea in response to emotional stress, anxiety, or certain foods. Almost everyone has diarrhea now and then. It usually isn't serious, and your stools will return to normal soon. The important thing to do is replace the fluids you have lost, so you can prevent dehydration. The doctor has checked you carefully, but problems can develop later. If you notice any problems or new symptoms, get medical treatment right away. Follow-up care is a key part of your treatment and safety. Be sure to make and go to all appointments, and call your doctor if you are having problems. It's also a good idea to know your test results and keep a list of the medicines you take. How can you care for yourself at home? · Watch for signs of dehydration, which means your body has lost too much water. Dehydration is a serious condition and should be treated right away. Signs of dehydration are:  ? Increasing thirst and dry eyes and mouth. ? Feeling faint or lightheaded. ? A smaller amount of urine than normal.  · To prevent dehydration, drink plenty of fluids. Choose water and other caffeine-free clear liquids until you feel better. If you have kidney, heart, or liver disease and have to limit fluids, talk with your doctor before you increase the amount of fluids you drink. · Begin eating small amounts of mild foods the next day, if you feel like it. ? Try yogurt that has live cultures of Lactobacillus. (Check the label.)  ? Avoid spicy foods, fruits, alcohol, and caffeine until 48 hours after all symptoms are gone. ? Avoid chewing gum that contains sorbitol. ?  Avoid dairy products (except for yogurt with Lactobacillus) while you have diarrhea and for 3 days after symptoms are gone. · The doctor may recommend that you take over-the-counter medicine, such as loperamide (Imodium), if you still have diarrhea after 6 hours. Read and follow all instructions on the label. Do not use this medicine if you have bloody diarrhea, a high fever, or other signs of serious illness. Call your doctor if you think you are having a problem with your medicine. When should you call for help? Call 911 anytime you think you may need emergency care. For example, call if:    · You passed out (lost consciousness).     · Your stools are maroon or very bloody.    Call your doctor now or seek immediate medical care if:    · You are dizzy or lightheaded, or you feel like you may faint.     · Your stools are black and look like tar, or they have streaks of blood.     · You have new or worse belly pain.     · You have symptoms of dehydration, such as:  ? Dry eyes and a dry mouth. ? Passing only a little dark urine. ? Feeling thirstier than usual.     · You have a new or higher fever.    Watch closely for changes in your health, and be sure to contact your doctor if:    · Your diarrhea is getting worse.     · You see pus in the diarrhea.     · You are not getting better after 2 days (48 hours). Where can you learn more? Go to https://[email or web address]. org and sign in to your [facility] account. Enter W933 in the [employer] box to learn more about \"Diarrhea: Care Instructions. \"     If you do not have an account, please click on the \"Sign Up Now\" link. Current as of: June 26, 2019  Content Version: 12.3  © 2308-4481 [employer]. Care instructions adapted under license by [facility] [employer] [facility] [facility] ([facility]). If you have questions about a medical condition or this instruction, always ask your healthcare professional. [address] any warranty or liability for your use of this information.

## 2020-01-17 NOTE — PROGRESS NOTES
note reviewed. Constitutional:       General: He is not in acute distress. Appearance: Normal appearance. He is not ill-appearing, toxic-appearing or diaphoretic. HENT:      Head: Normocephalic and atraumatic. Right Ear: External ear normal.      Left Ear: External ear normal.      Nose: Nose normal.      Mouth/Throat:      Mouth: Mucous membranes are moist.   Eyes:      Extraocular Movements: Extraocular movements intact. Conjunctiva/sclera: Conjunctivae normal.      Pupils: Pupils are equal, round, and reactive to light. Cardiovascular:      Rate and Rhythm: Normal rate and regular rhythm. Pulses: Normal pulses. Heart sounds: Normal heart sounds. Pulmonary:      Effort: Pulmonary effort is normal.      Breath sounds: Normal breath sounds. Abdominal:      General: There is no distension. Palpations: Abdomen is soft. There is no mass. Tenderness: There is no tenderness. There is no right CVA tenderness, left CVA tenderness, guarding or rebound. Hernia: No hernia is present. Skin:     General: Skin is warm and dry. Neurological:      Mental Status: He is alert and oriented to person, place, and time. DIFFERENTIAL DIAGNOSIS:       Harley Brown reviewed the disposition diagnosis with the patient and or their family/guardian. I have answered their questions and given discharge instructions. They voiced understanding of these instructions and did not have anyfurther questions or complaints.       PROCEDURES:  Orders Placed This Encounter   Procedures    Viral Stool Culture     Standing Status:   Future     Standing Expiration Date:   1/17/2021    Clostridium Difficile Toxin/Antigen     Standing Status:   Future     Standing Expiration Date:   1/17/2021    Giardia Antigen     Standing Status:   Future     Standing Expiration Date:   1/17/2021    O&P Panel (Travel Associated) #1     Standing Status:   Future     Standing Expiration Date:   1/17/2021    CBC Standing Status:   Future     Standing Expiration Date:   1/17/2021       No results found for this visit on 01/17/20. FINALIMPRESSION      1. Diarrhea, unspecified type            PLAN     Return if symptoms worsen or fail to improve. DISCHARGEMEDICATIONS:  Orders Placed This Encounter   Medications    loperamide (RA ANTI-DIARRHEAL) 2 MG capsule     Sig: Take 1 capsule by mouth 4 times daily as needed for Diarrhea     Dispense:  40 capsule     Refill:  0         Plan:  1. BRAT Diet   2. Increase fluid intake  3. Obtain Lab work/ Stool cultures as ordered  4. Take medication as prescribed    Patient instructed to return to the office if symptoms worsen, return, or have any other concerns. Patient understands and is agreeable.          Akin Soler PA-C 1/17/2020 3:44 PM

## 2020-01-17 NOTE — TELEPHONE ENCOUNTER
Patient is experiencing loose stools for about 3 weeks. He states it started with a cold, and the diarrhea came with it. He is now over the cold, but is still experiencing the diarrhea. Please advise.

## 2020-01-20 ENCOUNTER — HOSPITAL ENCOUNTER (OUTPATIENT)
Age: 80
Setting detail: SPECIMEN
Discharge: HOME | End: 2020-01-20
Payer: MEDICARE

## 2020-01-20 LAB
ALBUMIN SERPL-MCNC: 3.9 G/DL (ref 3.5–5.2)
ALBUMIN/GLOBULIN RATIO: 1.4 (ref 1–2.5)
ALP BLD-CCNC: 69 U/L (ref 40–129)
ALT SERPL-CCNC: 19 U/L (ref 5–41)
ANION GAP SERPL CALCULATED.3IONS-SCNC: 12 MMOL/L (ref 9–17)
AST SERPL-CCNC: 20 U/L
BILIRUB SERPL-MCNC: 1.07 MG/DL (ref 0.3–1.2)
BUN BLDV-MCNC: 20 MG/DL (ref 8–23)
BUN/CREAT BLD: NORMAL (ref 9–20)
CALCIUM SERPL-MCNC: 9.2 MG/DL (ref 8.6–10.4)
CHLORIDE BLD-SCNC: 100 MMOL/L (ref 98–107)
CO2: 25 MMOL/L (ref 20–31)
CREAT SERPL-MCNC: 0.83 MG/DL (ref 0.7–1.2)
GFR AFRICAN AMERICAN: >60 ML/MIN
GFR NON-AFRICAN AMERICAN: >60 ML/MIN
GFR SERPL CREATININE-BSD FRML MDRD: NORMAL ML/MIN/{1.73_M2}
GFR SERPL CREATININE-BSD FRML MDRD: NORMAL ML/MIN/{1.73_M2}
GLUCOSE BLD-MCNC: 83 MG/DL (ref 70–99)
HCT VFR BLD CALC: 47.8 % (ref 40.7–50.3)
HEMOGLOBIN: 15.7 G/DL (ref 13–17)
MCH RBC QN AUTO: 32 PG (ref 25.2–33.5)
MCHC RBC AUTO-ENTMCNC: 32.8 G/DL (ref 28.4–34.8)
MCV RBC AUTO: 97.4 FL (ref 82.6–102.9)
NRBC AUTOMATED: 0 PER 100 WBC
PDW BLD-RTO: 13 % (ref 11.8–14.4)
PLATELET # BLD: NORMAL K/UL (ref 138–453)
PLATELET, FLUORESCENCE: 135 K/UL (ref 138–453)
PLATELET, IMMATURE FRACTION: 4.2 % (ref 1.1–10.3)
PMV BLD AUTO: NORMAL FL (ref 8.1–13.5)
POTASSIUM SERPL-SCNC: 4.3 MMOL/L (ref 3.7–5.3)
RBC # BLD: 4.91 M/UL (ref 4.21–5.77)
SODIUM BLD-SCNC: 137 MMOL/L (ref 135–144)
TOTAL PROTEIN: 6.7 G/DL (ref 6.4–8.3)
WBC # BLD: 5.6 K/UL (ref 3.5–11.3)

## 2020-01-21 ENCOUNTER — HOSPITAL ENCOUNTER (OUTPATIENT)
Age: 80
Setting detail: SPECIMEN
Discharge: HOME | End: 2020-01-21
Payer: MEDICARE

## 2020-01-21 LAB
C DIFF AG + TOXIN: NEGATIVE
DIRECT EXAM: NORMAL
Lab: NORMAL
Lab: NORMAL
MICRO OVA & PARASITES: NORMAL
SPECIMEN DESCRIPTION: NORMAL

## 2020-01-22 LAB
DIRECT EXAM: NORMAL
DIRECT EXAM: NORMAL
Lab: NORMAL
SPECIMEN DESCRIPTION: NORMAL

## 2020-01-22 RX ORDER — ATENOLOL 25 MG/1
TABLET ORAL
Qty: 90 TABLET | Refills: 0 | Status: SHIPPED | OUTPATIENT
Start: 2020-01-22 | End: 2020-02-26 | Stop reason: SDUPTHER

## 2020-01-26 LAB
CULTURE: NORMAL
CULTURE: NORMAL
Lab: NORMAL
SPECIMEN DESCRIPTION: NORMAL

## 2020-01-27 ENCOUNTER — OFFICE VISIT (OUTPATIENT)
Dept: PRIMARY CARE CLINIC | Age: 80
End: 2020-01-27
Payer: MEDICARE

## 2020-01-27 ENCOUNTER — HOSPITAL ENCOUNTER (OUTPATIENT)
Age: 80
Discharge: HOME | End: 2020-01-27
Payer: MEDICARE

## 2020-01-27 VITALS
WEIGHT: 193.6 LBS | OXYGEN SATURATION: 95 % | DIASTOLIC BLOOD PRESSURE: 82 MMHG | SYSTOLIC BLOOD PRESSURE: 126 MMHG | RESPIRATION RATE: 16 BRPM | HEART RATE: 50 BPM | HEIGHT: 71 IN | BODY MASS INDEX: 27.1 KG/M2

## 2020-01-27 PROCEDURE — G8427 DOCREV CUR MEDS BY ELIG CLIN: HCPCS | Performed by: NURSE PRACTITIONER

## 2020-01-27 PROCEDURE — 1036F TOBACCO NON-USER: CPT | Performed by: NURSE PRACTITIONER

## 2020-01-27 PROCEDURE — 4040F PNEUMOC VAC/ADMIN/RCVD: CPT | Performed by: NURSE PRACTITIONER

## 2020-01-27 PROCEDURE — G8482 FLU IMMUNIZE ORDER/ADMIN: HCPCS | Performed by: NURSE PRACTITIONER

## 2020-01-27 PROCEDURE — G8417 CALC BMI ABV UP PARAM F/U: HCPCS | Performed by: NURSE PRACTITIONER

## 2020-01-27 PROCEDURE — 99214 OFFICE O/P EST MOD 30 MIN: CPT | Performed by: NURSE PRACTITIONER

## 2020-01-27 PROCEDURE — 93005 ELECTROCARDIOGRAM TRACING: CPT | Performed by: NURSE PRACTITIONER

## 2020-01-27 PROCEDURE — 1123F ACP DISCUSS/DSCN MKR DOCD: CPT | Performed by: NURSE PRACTITIONER

## 2020-01-27 ASSESSMENT — PATIENT HEALTH QUESTIONNAIRE - PHQ9
SUM OF ALL RESPONSES TO PHQ9 QUESTIONS 1 & 2: 0
SUM OF ALL RESPONSES TO PHQ QUESTIONS 1-9: 0
1. LITTLE INTEREST OR PLEASURE IN DOING THINGS: 0
2. FEELING DOWN, DEPRESSED OR HOPELESS: 0
SUM OF ALL RESPONSES TO PHQ QUESTIONS 1-9: 0

## 2020-01-27 ASSESSMENT — ENCOUNTER SYMPTOMS
COUGH: 0
ABDOMINAL DISTENTION: 0
DIARRHEA: 0
BACK PAIN: 0
SHORTNESS OF BREATH: 0
ABDOMINAL PAIN: 0

## 2020-01-27 NOTE — PROGRESS NOTES
[facility] PRIMARY CARE  [address]   [address]  [address] [address]  Dept: [phone]  Dept Fax: [phone]    Bob Meneses is a [de-identified] y.o. male who presentstoday for his medical conditions/complaints as noted below. Bob Meneses is c/o of  Chief Complaint   Patient presents with    Diarrhea     2 week recheck Pt states is getting better            HPI:     Presents today for 2 week follow up     He was seen at the walk in on 1/17/20 for diarrhea   Rx given for blood work and loperamide  States he has soft stools  Has not had a solid bowel movement  States he feels much better   He originally would wake up in the middle of the night due to diarrhea. This has resolved  Frequency of bowel movements have decreased   Reviewed labs, all WNL     Normally has a low heart rate, associated with atenolol use and he is asymptomatic  No EKG on record  Denies any chest pain, light headedness, dizziness    Up-to-date on immunizations  Denies any other problems/concerns       No results found for: LABA1C          ( goal A1C is < 7)   No results found for: LABMICR  LDL Cholesterol (mg/dL)   Date Value   06/03/2019 88   09/18/2017 99   09/13/2016 100     LDL Calculated (mg/dL)   Date Value   03/14/2013 86       (goal LDL is <100)   AST (U/L)   Date Value   01/20/2020 20     ALT (U/L)   Date Value   01/20/2020 19     BUN (mg/dL)   Date Value   01/20/2020 20     BP Readings from Last 3 Encounters:   01/27/20 126/82   01/17/20 (!) 148/76   10/31/19 112/80          ([id number])    Past Medical History:   Diagnosis Date    Cancer ([address]     basal cell    Diverticulosis     coli    Plantar fasciitis       Past Surgical History:   Procedure Laterality Date    CATARACT REMOVAL WITH IMPLANT Bilateral may 2013    COLONOSCOPY      HERNIA REPAIR      SKIN CANCER EXCISION      multi sites    VEIN SURGERY         History reviewed. No pertinent family history.        Social History

## 2020-01-28 LAB
EKG ATRIAL RATE: 64 BPM
EKG P AXIS: 77 DEGREES
EKG P-R INTERVAL: 218 MS
EKG Q-T INTERVAL: 422 MS
EKG QRS DURATION: 90 MS
EKG QTC CALCULATION (BAZETT): 468 MS
EKG R AXIS: 3 DEGREES
EKG T AXIS: 44 DEGREES
EKG VENTRICULAR RATE: 74 BPM

## 2020-01-28 PROCEDURE — 93010 ELECTROCARDIOGRAM REPORT: CPT | Performed by: INTERNAL MEDICINE

## 2020-02-25 ENCOUNTER — TELEPHONE (OUTPATIENT)
Dept: PRIMARY CARE CLINIC | Age: 80
End: 2020-02-25

## 2020-02-25 NOTE — TELEPHONE ENCOUNTER
Can we check on patient and make sure he was able to schedule repeat colonoscopy with Dr. Kelly Avila. Did he have it done or a date? If done can we get a copy of report. If not can we help him schedule?   thanks

## 2020-02-26 RX ORDER — ATORVASTATIN CALCIUM 10 MG/1
TABLET, FILM COATED ORAL
Qty: 90 TABLET | Refills: 3 | Status: SHIPPED | OUTPATIENT
Start: 2020-02-26 | End: 2021-02-15 | Stop reason: SDUPTHER

## 2020-02-26 RX ORDER — ATENOLOL 25 MG/1
25 TABLET ORAL DAILY
Qty: 90 TABLET | Refills: 3 | Status: SHIPPED | OUTPATIENT
Start: 2020-02-26 | End: 2020-10-12 | Stop reason: SDUPTHER

## 2020-02-26 RX ORDER — LISINOPRIL 20 MG/1
40 TABLET ORAL DAILY
Qty: 180 TABLET | Refills: 3 | Status: SHIPPED | OUTPATIENT
Start: 2020-02-26 | End: 2021-02-15 | Stop reason: SDUPTHER

## 2020-02-27 ENCOUNTER — TELEPHONE (OUTPATIENT)
Dept: ONCOLOGY | Age: 80
End: 2020-02-27

## 2020-02-27 NOTE — TELEPHONE ENCOUNTER
Pt needs 1 year f/u in June with Dr. Judith Garg. Lm informing pt that Dr. Judith Garg no longer practices at [facility] so pt can either transfer care to [facility] or we can schedule pt with one of our MD's. Awaiting call back. Pt needs cbc prior to rv.

## 2020-05-04 ENCOUNTER — OFFICE VISIT (OUTPATIENT)
Dept: PRIMARY CARE CLINIC | Age: 80
End: 2020-05-04
Payer: MEDICARE

## 2020-05-04 VITALS
HEART RATE: 72 BPM | DIASTOLIC BLOOD PRESSURE: 78 MMHG | RESPIRATION RATE: 15 BRPM | BODY MASS INDEX: 27.8 KG/M2 | OXYGEN SATURATION: 98 % | WEIGHT: 198.6 LBS | HEIGHT: 71 IN | SYSTOLIC BLOOD PRESSURE: 122 MMHG

## 2020-05-04 PROCEDURE — 99214 OFFICE O/P EST MOD 30 MIN: CPT | Performed by: NURSE PRACTITIONER

## 2020-05-04 PROCEDURE — 1123F ACP DISCUSS/DSCN MKR DOCD: CPT | Performed by: NURSE PRACTITIONER

## 2020-05-04 PROCEDURE — 1036F TOBACCO NON-USER: CPT | Performed by: NURSE PRACTITIONER

## 2020-05-04 PROCEDURE — 4040F PNEUMOC VAC/ADMIN/RCVD: CPT | Performed by: NURSE PRACTITIONER

## 2020-05-04 PROCEDURE — G8427 DOCREV CUR MEDS BY ELIG CLIN: HCPCS | Performed by: NURSE PRACTITIONER

## 2020-05-04 PROCEDURE — G8417 CALC BMI ABV UP PARAM F/U: HCPCS | Performed by: NURSE PRACTITIONER

## 2020-05-04 RX ORDER — RIVAROXABAN 10 MG/1
10 TABLET, FILM COATED ORAL
COMMUNITY
End: 2021-02-15 | Stop reason: SDUPTHER

## 2020-05-04 ASSESSMENT — ENCOUNTER SYMPTOMS
COUGH: 0
ABDOMINAL PAIN: 0
BACK PAIN: 0
SHORTNESS OF BREATH: 0

## 2020-10-08 ENCOUNTER — TELEPHONE (OUTPATIENT)
Dept: PRIMARY CARE CLINIC | Age: 80
End: 2020-10-08

## 2020-10-08 NOTE — TELEPHONE ENCOUNTER
Patient calling in stating his BP has been elevated, today it was 183-101 at 9am after he took his lisinopril, he then took atenolol and retook BP at 11:45am and it was 170/98. He said normally he waits till afternoon to take the atenolol but with it being so high in the morning he went ahead and took it.  Please advise, he denies any symptoms with the high BP.

## 2020-10-09 NOTE — TELEPHONE ENCOUNTER
Pt called office back today to inform PCP of BP readings after instruction of use of medication by PCP, they are as follows:    Yesterday - 9 am - 183/101   11:45 am - 170/98   2 pm - 164/99   Took med Atenolol at 5 pm - 153/87    Took med Lisinopril this morning - 158/87

## 2020-10-09 NOTE — TELEPHONE ENCOUNTER
OK they were high but better  Atenolol will lower BP but also lowers his heart rate. Please have him monitor this as well  Continue with previous plan below. Call us on Monday with BP and HR readings please.  thanks

## 2020-10-12 RX ORDER — ATENOLOL 25 MG/1
25 TABLET ORAL 2 TIMES DAILY
Qty: 180 TABLET | Refills: 3 | Status: SHIPPED | OUTPATIENT
Start: 2020-10-12 | End: 2022-01-03 | Stop reason: SDUPTHER

## 2020-10-12 NOTE — TELEPHONE ENCOUNTER
BP readings from over the weekend. ..     Friday 10/9- /87 HR 66  Saturday 10/10- /84 HR 67  Patrick 10/11- /68  Monday 10/12- /85 HR 70

## 2020-10-12 NOTE — TELEPHONE ENCOUNTER
Continue atenolol BID  Will send new rx  Continue to monitor BP and HR prior to taking second dose  thanks

## 2020-10-19 ENCOUNTER — OFFICE VISIT (OUTPATIENT)
Dept: PRIMARY CARE CLINIC | Age: 80
End: 2020-10-19
Payer: MEDICARE

## 2020-10-19 VITALS
DIASTOLIC BLOOD PRESSURE: 100 MMHG | SYSTOLIC BLOOD PRESSURE: 170 MMHG | TEMPERATURE: 98.6 F | OXYGEN SATURATION: 98 % | WEIGHT: 198 LBS | HEART RATE: 62 BPM | BODY MASS INDEX: 27.62 KG/M2

## 2020-10-19 PROCEDURE — 99214 OFFICE O/P EST MOD 30 MIN: CPT | Performed by: NURSE PRACTITIONER

## 2020-10-19 RX ORDER — CHLORTHALIDONE 25 MG/1
25 TABLET ORAL DAILY
Qty: 30 TABLET | Refills: 3 | Status: SHIPPED | OUTPATIENT
Start: 2020-10-19 | End: 2021-02-15 | Stop reason: SDUPTHER

## 2020-10-19 ASSESSMENT — ENCOUNTER SYMPTOMS
EYE ITCHING: 0
EYE DISCHARGE: 0
NAUSEA: 0
DIARRHEA: 0
BLURRED VISION: 0
SHORTNESS OF BREATH: 0
SORE THROAT: 0
EYE REDNESS: 0
ABDOMINAL PAIN: 0
SINUS PRESSURE: 0
ORTHOPNEA: 0
VOMITING: 0
SINUS PAIN: 0
TROUBLE SWALLOWING: 0
ALLERGIC/IMMUNOLOGIC NEGATIVE: 1
COUGH: 0
CHEST TIGHTNESS: 0
WHEEZING: 0

## 2020-10-19 NOTE — PROGRESS NOTES
gradually worsening since onset. The problem is uncontrolled. Pertinent negatives include no anxiety, blurred vision, chest pain, headaches, malaise/fatigue, neck pain, orthopnea, palpitations, peripheral edema or shortness of breath. There are no associated agents to hypertension. Risk factors for coronary artery disease include male gender. Past treatments include ACE inhibitors and beta blockers. Past Medical History:   Diagnosis Date    Cancer ([address]     basal cell    Diverticulosis     coli    Plantar fasciitis       Past Surgical History:   Procedure Laterality Date    CATARACT REMOVAL WITH IMPLANT Bilateral may 2013    COLONOSCOPY      HERNIA REPAIR      SKIN CANCER EXCISION      multi sites    VEIN SURGERY         History reviewed. No pertinent family history. Social History     Tobacco Use    Smoking status: Former Smoker     Packs/day: 1.50     Years: 22.00     Pack years: 33.00     Types: Cigarettes     Last attempt to quit: 1981     Years since quittin.8    Smokeless tobacco: Never Used   Substance Use Topics    Alcohol use: Yes     Comment: 1 drink daily      Current Outpatient Medications   Medication Sig Dispense Refill    chlorthalidone (HYGROTON) 25 MG tablet Take 1 tablet by mouth daily 30 tablet 3    atenolol (TENORMIN) 25 MG tablet Take 1 tablet by mouth 2 times daily 180 tablet 3    rivaroxaban (XARELTO) 10 MG TABS tablet Take 10 mg by mouth daily (with breakfast)      atorvastatin (LIPITOR) 10 MG tablet TAKE 1 TABLET BY MOUTH ONCE DAILY 90 tablet 3    lisinopril (PRINIVIL;ZESTRIL) 20 MG tablet Take 2 tablets by mouth daily 180 tablet 3    Multiple Vitamins-Minerals (THERAPEUTIC MULTIVITAMIN-MINERALS) tablet Take 1 tablet by mouth daily. No current facility-administered medications for this visit.       Allergies   Allergen Reactions    Sulfa Antibiotics        Health Maintenance   Topic Date Due    Lipid screen  2020   [facility] Annual Wellness Visit (AWV) 10/31/2020    Potassium monitoring  01/20/2021    Creatinine monitoring  01/20/2021    DTaP/Tdap/Td vaccine (2 - Td) 11/15/2026    Flu vaccine  Completed    Shingles Vaccine  Completed    Pneumococcal 65+ years Vaccine  Completed    Hepatitis A vaccine  Aged Out    Hepatitis B vaccine  Aged Out    Hib vaccine  Aged Out    Meningococcal (ACWY) vaccine  Aged Out       :     Review of Systems   Constitutional: Negative for chills, fatigue, fever and malaise/fatigue. HENT: Negative for ear discharge, ear pain, sinus pressure, sinus pain, sore throat and trouble swallowing. Eyes: Negative for blurred vision, discharge, redness and itching. Respiratory: Negative for cough, chest tightness, shortness of breath and wheezing. Cardiovascular: Negative for chest pain, palpitations and orthopnea. Gastrointestinal: Negative for abdominal pain, diarrhea, nausea and vomiting. Endocrine: Negative. Genitourinary: Negative for difficulty urinating. Musculoskeletal: Negative for arthralgias and neck pain. Skin: Negative for rash. Allergic/Immunologic: Negative. Neurological: Negative for dizziness, weakness, light-headedness and headaches. Hematological: Negative. Psychiatric/Behavioral: Negative. All other systems reviewed and are negative. Objective:     Physical Exam  Constitutional:       Appearance: Normal appearance. He is normal weight. HENT:      Head: Normocephalic and atraumatic. Nose: Nose normal.   Eyes:      Extraocular Movements: Extraocular movements intact. Conjunctiva/sclera: Conjunctivae normal.      Pupils: Pupils are equal, round, and reactive to light. Neck:      Musculoskeletal: Neck supple. Cardiovascular:      Rate and Rhythm: Normal rate and regular rhythm. Pulses: Normal pulses. Heart sounds: Normal heart sounds. Pulmonary:      Effort: Pulmonary effort is normal.      Breath sounds: Normal breath sounds.    Abdominal:      General: Abdomen is flat. Palpations: Abdomen is soft. Musculoskeletal: Normal range of motion. Right lower le+ Pitting Edema present. Left lower le+ Pitting Edema present. Skin:     General: Skin is warm and dry. Capillary Refill: Capillary refill takes less than 2 seconds. Neurological:      General: No focal deficit present. Mental Status: He is alert and oriented to person, place, and time. Psychiatric:         Mood and Affect: Mood normal.       BP (!) 170/100   Pulse 62   Temp 98.6 °F (37 °C)   Wt 198 lb (89.8 kg)   SpO2 98%   BMI 27.62 kg/m²     Assessment:       Diagnosis Orders   1. Essential hypertension     2. Bilateral edema of lower extremity     3. History of DVT (deep vein thrombosis)     4. Dyslipidemia         :      Return in about 1 week (around 10/26/2020). 1.) HTN  -c/w Atenolol 25mg BID and lisinopril 40 mg daily  -rx for chlorthalidone 25mg daily. I discussed this will make him urinate more. Discussed s/s to monitor for.   -discussed dietary modifications such as decreasing salt intake  -discussed increasing exercise as wlel  2.) bilateral edema of lower extremity   --start chlorthalidone  -c/w support stockings  3.) hx of DVT  -on xaralto  -c/w support stockings   4.) dyslipidemia   -on lipitor     F/u in one week with sepideh to have bp reassessed and swelling of lower extremities. Orders Placed This Encounter   Medications    chlorthalidone (HYGROTON) 25 MG tablet     Sig: Take 1 tablet by mouth daily     Dispense:  30 tablet     Refill:  3       Patient given educational materials - seepatient instructions. Discussed use, benefit, and side effects of prescribed medications. All patient questions answered. Pt voiced understanding. Reviewed health maintenance. Instructed to continue current medications, diet and exercise. Patient agreedwith treatment plan. Follow up as directed.       Electronically signed by BOBBY Vargas - TREVOR on 10/19/2020at 9:11 AM

## 2020-10-28 ASSESSMENT — LIFESTYLE VARIABLES
AUDIT-C TOTAL SCORE: 4
HAS A RELATIVE, FRIEND, DOCTOR, OR ANOTHER HEALTH PROFESSIONAL EXPRESSED CONCERN ABOUT YOUR DRINKING OR SUGGESTED YOU CUT DOWN: 0
HOW OFTEN DURING THE LAST YEAR HAVE YOU FAILED TO DO WHAT WAS NORMALLY EXPECTED FROM YOU BECAUSE OF DRINKING: 0
HOW OFTEN DURING THE LAST YEAR HAVE YOU NEEDED AN ALCOHOLIC DRINK FIRST THING IN THE MORNING TO GET YOURSELF GOING AFTER A NIGHT OF HEAVY DRINKING: NEVER
AUDIT TOTAL SCORE: 4
HOW MANY STANDARD DRINKS CONTAINING ALCOHOL DO YOU HAVE ON A TYPICAL DAY: ONE OR TWO
AUDIT-C TOTAL SCORE: 0
HOW OFTEN DO YOU HAVE SIX OR MORE DRINKS ON ONE OCCASION: NEVER
HOW OFTEN DO YOU HAVE A DRINK CONTAINING ALCOHOL: 4
HOW OFTEN DURING THE LAST YEAR HAVE YOU BEEN UNABLE TO REMEMBER WHAT HAPPENED THE NIGHT BEFORE BECAUSE YOU HAD BEEN DRINKING: NEVER
HOW OFTEN DURING THE LAST YEAR HAVE YOU NEEDED AN ALCOHOLIC DRINK FIRST THING IN THE MORNING TO GET YOURSELF GOING AFTER A NIGHT OF HEAVY DRINKING: 0
HOW OFTEN DURING THE LAST YEAR HAVE YOU FOUND THAT YOU WERE NOT ABLE TO STOP DRINKING ONCE YOU HAD STARTED: 0
HAVE YOU OR SOMEONE ELSE BEEN INJURED AS A RESULT OF YOUR DRINKING: 0
HOW OFTEN DURING THE LAST YEAR HAVE YOU FAILED TO DO WHAT WAS NORMALLY EXPECTED FROM YOU BECAUSE OF DRINKING: NEVER
HOW OFTEN DURING THE LAST YEAR HAVE YOU FOUND THAT YOU WERE NOT ABLE TO STOP DRINKING ONCE YOU HAD STARTED: NEVER
HAVE YOU OR SOMEONE ELSE BEEN INJURED AS A RESULT OF YOUR DRINKING: NO
HAS A RELATIVE, FRIEND, DOCTOR, OR ANOTHER HEALTH PROFESSIONAL EXPRESSED CONCERN ABOUT YOUR DRINKING OR SUGGESTED YOU CUT DOWN: NO
HOW OFTEN DURING THE LAST YEAR HAVE YOU BEEN UNABLE TO REMEMBER WHAT HAPPENED THE NIGHT BEFORE BECAUSE YOU HAD BEEN DRINKING: 0
HOW OFTEN DURING THE LAST YEAR HAVE YOU HAD A FEELING OF GUILT OR REMORSE AFTER DRINKING: NEVER
AUDIT TOTAL SCORE: 0
HOW OFTEN DURING THE LAST YEAR HAVE YOU HAD A FEELING OF GUILT OR REMORSE AFTER DRINKING: 0
HOW MANY STANDARD DRINKS CONTAINING ALCOHOL DO YOU HAVE ON A TYPICAL DAY: 0
HOW OFTEN DO YOU HAVE SIX OR MORE DRINKS ON ONE OCCASION: 0
HOW OFTEN DO YOU HAVE A DRINK CONTAINING ALCOHOL: FOUR OR MORE TIMES A WEEK

## 2020-10-28 ASSESSMENT — PATIENT HEALTH QUESTIONNAIRE - PHQ9
1. LITTLE INTEREST OR PLEASURE IN DOING THINGS: 0
2. FEELING DOWN, DEPRESSED OR HOPELESS: 0
SUM OF ALL RESPONSES TO PHQ9 QUESTIONS 1 & 2: 0
SUM OF ALL RESPONSES TO PHQ QUESTIONS 1-9: 0

## 2020-11-02 ENCOUNTER — OFFICE VISIT (OUTPATIENT)
Dept: PRIMARY CARE CLINIC | Age: 80
End: 2020-11-02
Payer: MEDICARE

## 2020-11-02 VITALS
HEART RATE: 71 BPM | DIASTOLIC BLOOD PRESSURE: 76 MMHG | SYSTOLIC BLOOD PRESSURE: 132 MMHG | OXYGEN SATURATION: 98 % | TEMPERATURE: 98.6 F | WEIGHT: 211.2 LBS | BODY MASS INDEX: 29.46 KG/M2

## 2020-11-02 PROCEDURE — 4040F PNEUMOC VAC/ADMIN/RCVD: CPT | Performed by: NURSE PRACTITIONER

## 2020-11-02 PROCEDURE — G8484 FLU IMMUNIZE NO ADMIN: HCPCS | Performed by: NURSE PRACTITIONER

## 2020-11-02 PROCEDURE — 1123F ACP DISCUSS/DSCN MKR DOCD: CPT | Performed by: NURSE PRACTITIONER

## 2020-11-02 PROCEDURE — G8427 DOCREV CUR MEDS BY ELIG CLIN: HCPCS | Performed by: NURSE PRACTITIONER

## 2020-11-02 PROCEDURE — G8417 CALC BMI ABV UP PARAM F/U: HCPCS | Performed by: NURSE PRACTITIONER

## 2020-11-02 PROCEDURE — 99214 OFFICE O/P EST MOD 30 MIN: CPT | Performed by: NURSE PRACTITIONER

## 2020-11-02 PROCEDURE — 1036F TOBACCO NON-USER: CPT | Performed by: NURSE PRACTITIONER

## 2020-11-02 ASSESSMENT — ENCOUNTER SYMPTOMS
SHORTNESS OF BREATH: 0
BACK PAIN: 0
COUGH: 0
ABDOMINAL PAIN: 0

## 2020-11-02 ASSESSMENT — PATIENT HEALTH QUESTIONNAIRE - PHQ9
SUM OF ALL RESPONSES TO PHQ QUESTIONS 1-9: 0
SUM OF ALL RESPONSES TO PHQ QUESTIONS 1-9: 0
SUM OF ALL RESPONSES TO PHQ9 QUESTIONS 1 & 2: 0
2. FEELING DOWN, DEPRESSED OR HOPELESS: 0
SUM OF ALL RESPONSES TO PHQ QUESTIONS 1-9: 0
1. LITTLE INTEREST OR PLEASURE IN DOING THINGS: 0

## 2020-11-02 NOTE — PROGRESS NOTES
[facility] PRIMARY CARE  [address]   [address]  [address] [address]  Dept: [phone]  Dept Fax: [phone]    Mariya Obrien is a 79 y.o. male who presentstoday for his medical conditions/complaints as noted below. Mariya Obrien is c/o of  Chief Complaint   Patient presents with    Hypertension           HPI:     Presents for one week follow up on BP  Weight is up, 13lb. He feels it is due to winter clothes. Unable to work out- does not plan to return to gym  Was seen last week for elevated BP, noted BLE edema  Started on [place]gro[place]  Has had decrease in BP readings  Slight decrease in swelling, wearing bianca hose    Reviewed home BP readings, consistently   130-140/80s  Denies any side effects with medication  Willing to continue    Willing to update labs    Denies any other problems/concerns        No results found for: LABA1C          ( goal A1C is < 7)   No results found for: LABMICR  LDL Cholesterol (mg/dL)   Date Value   06/03/2019 88   09/18/2017 99   09/13/2016 100     LDL Calculated (mg/dL)   Date Value   03/14/2013 86       (goal LDL is <100)   AST (U/L)   Date Value   01/20/2020 20     ALT (U/L)   Date Value   01/20/2020 19     BUN (mg/dL)   Date Value   01/20/2020 20     BP Readings from Last 3 Encounters:   11/02/20 132/76   10/19/20 (!) 170/100   05/04/20 122/78          ([id number])    Past Medical History:   Diagnosis Date    Cancer ([facility] [address]     basal cell    Diverticulosis     coli    Plantar fasciitis       Past Surgical History:   Procedure Laterality Date    CATARACT REMOVAL WITH IMPLANT Bilateral may 2013    COLONOSCOPY      HERNIA REPAIR      SKIN CANCER EXCISION      multi sites    VEIN SURGERY         History reviewed. No pertinent family history.        Social History     Tobacco Use    Smoking status: Former Smoker     Packs/day: 1.50     Years: 22.00     Pack years: 33.00     Types: Cigarettes     Last attempt to quit: 1/1/1981     Years since quittin.8    Smokeless tobacco: Never Used   Substance Use Topics    Alcohol use: Yes     Comment: 1 drink daily      Current Outpatient Medications   Medication Sig Dispense Refill    chlorthalidone (HYGROTON) 25 MG tablet Take 1 tablet by mouth daily 30 tablet 3    atenolol (TENORMIN) 25 MG tablet Take 1 tablet by mouth 2 times daily 180 tablet 3    rivaroxaban (XARELTO) 10 MG TABS tablet Take 10 mg by mouth daily (with breakfast)      atorvastatin (LIPITOR) 10 MG tablet TAKE 1 TABLET BY MOUTH ONCE DAILY 90 tablet 3    lisinopril (PRINIVIL;ZESTRIL) 20 MG tablet Take 2 tablets by mouth daily 180 tablet 3    Multiple Vitamins-Minerals (THERAPEUTIC MULTIVITAMIN-MINERALS) tablet Take 1 tablet by mouth daily. No current facility-administered medications for this visit. Allergies   Allergen Reactions    Sulfa Antibiotics        Health Maintenance   Topic Date Due    Annual Wellness Visit (AWV)  2019    Lipid screen  2020    Potassium monitoring  2021    Creatinine monitoring  2021    DTaP/Tdap/Td vaccine (2 - Td) 11/15/2026    Flu vaccine  Completed    Shingles Vaccine  Completed    Pneumococcal 65+ years Vaccine  Completed    Hepatitis A vaccine  Aged Out    Hepatitis B vaccine  Aged Out    Hib vaccine  Aged Out    Meningococcal (ACWY) vaccine  Aged Out       Subjective:      Review of Systems   Constitutional: Negative for chills, fatigue and fever. HENT: Negative for congestion. Eyes: Negative for visual disturbance. Respiratory: Negative for cough and shortness of breath. Cardiovascular: Positive for leg swelling. Negative for chest pain and palpitations. Gastrointestinal: Negative for abdominal pain. Genitourinary: Negative for difficulty urinating and dysuria. Musculoskeletal: Negative for arthralgias and back pain. Neurological: Negative for dizziness and headaches.    Psychiatric/Behavioral: Negative for self-injury, sleep

## 2020-11-03 ENCOUNTER — HOSPITAL ENCOUNTER (OUTPATIENT)
Age: 80
Setting detail: SPECIMEN
Discharge: HOME | End: 2020-11-03
Payer: MEDICARE

## 2020-11-03 LAB
CHOLESTEROL/HDL RATIO: 2.7
CHOLESTEROL: 154 MG/DL
HDLC SERPL-MCNC: 57 MG/DL
LDL CHOLESTEROL: 81 MG/DL (ref 0–130)
PROSTATE SPECIFIC ANTIGEN: 3.72 UG/L
TRIGL SERPL-MCNC: 81 MG/DL
VLDLC SERPL CALC-MCNC: NORMAL MG/DL (ref 1–30)

## 2020-11-04 ENCOUNTER — OFFICE VISIT (OUTPATIENT)
Dept: PRIMARY CARE CLINIC | Age: 80
End: 2020-11-04
Payer: MEDICARE

## 2020-11-04 VITALS
DIASTOLIC BLOOD PRESSURE: 72 MMHG | SYSTOLIC BLOOD PRESSURE: 134 MMHG | TEMPERATURE: 98.2 F | HEART RATE: 70 BPM | RESPIRATION RATE: 16 BRPM | HEIGHT: 71 IN | WEIGHT: 208 LBS | BODY MASS INDEX: 29.12 KG/M2

## 2020-11-04 PROCEDURE — G8484 FLU IMMUNIZE NO ADMIN: HCPCS | Performed by: NURSE PRACTITIONER

## 2020-11-04 PROCEDURE — 1123F ACP DISCUSS/DSCN MKR DOCD: CPT | Performed by: NURSE PRACTITIONER

## 2020-11-04 PROCEDURE — G0439 PPPS, SUBSEQ VISIT: HCPCS | Performed by: NURSE PRACTITIONER

## 2020-11-04 PROCEDURE — 4040F PNEUMOC VAC/ADMIN/RCVD: CPT | Performed by: NURSE PRACTITIONER

## 2020-11-04 NOTE — PROGRESS NOTES
Medicare Annual Wellness Visit  Name: Kade Dc Date: 2020   MRN: [id number] Sex: Male   Age: 79 y.o. Ethnicity: Non-/Non    : 1940 Race: Kelly Aguirre is here for Medicare AWV    Screenings for behavioral, psychosocial and functional/safety risks, and cognitive dysfunction are all negative except as indicated below. These results, as well as other patient data from the [address] form, are documented in Flowsheets linked to this Encounter. Allergies   Allergen Reactions    Sulfa Antibiotics        Prior to Visit Medications    Medication Sig Taking? Authorizing Provider   chlorthalidone (HYGROTON) 25 MG tablet Take 1 tablet by mouth daily  BOBBY Landrum CNP   atenolol (TENORMIN) 25 MG tablet Take 1 tablet by mouth 2 times daily  BOBBY Farah CNP   rivaroxaban (XARELTO) 10 MG TABS tablet Take 10 mg by mouth daily (with breakfast)  Historical Provider, MD   atorvastatin (LIPITOR) 10 MG tablet TAKE 1 TABLET BY MOUTH ONCE DAILY  BOBBY Farah CNP   lisinopril (PRINIVIL;ZESTRIL) 20 MG tablet Take 2 tablets by mouth daily  BOBBY Farah CNP   Multiple Vitamins-Minerals (THERAPEUTIC MULTIVITAMIN-MINERALS) tablet Take 1 tablet by mouth daily. Historical Provider, MD       Past Medical History:   Diagnosis Date    Cancer ([address]     basal cell    Diverticulosis     coli    Plantar fasciitis        Past Surgical History:   Procedure Laterality Date    CATARACT REMOVAL WITH IMPLANT Bilateral may 2013    COLONOSCOPY      HERNIA REPAIR      SKIN CANCER EXCISION      multi sites    VEIN SURGERY         History reviewed. No pertinent family history.     CareTeam (Including outside providers/suppliers regularly involved in providing care):   Patient Care Team:  BOBBY Farah CNP as PCP - General (Nurse Practitioner)  BOBBY Farah CNP as PCP - [facility] Empaneled Provider    Wt Readings from Last 3 Encounters:   11/04/20 208 lb (94.3 kg)   11/02/20 211 lb 3.2 oz (95.8 kg)   10/19/20 198 lb (89.8 kg)     Vitals:    11/04/20 1406   BP: 134/72   Pulse: 70   Resp: 16   Temp: 98.2 °F (36.8 °C)   Weight: 208 lb (94.3 kg)   Height: 5' 10.5\" (1.791 m)     Body mass index is 29.42 kg/m². Based upon direct observation of the patient, evaluation of cognition reveals recent and remote memory intact. General Appearance: alert and oriented to person, place and time, well developed and well- nourished, in no acute distress  Skin: warm and dry, no rash or erythema  Head: normocephalic and atraumatic  Eyes: pupils equal, round, and reactive to light, extraocular eye movements intact, conjunctivae normal  ENT: tympanic membrane, external ear and ear canal normal bilaterally, nose without deformity, nasal mucosa and turbinates normal without polyps  Neck: supple and non-tender without mass, no thyromegaly or thyroid nodules, no cervical lymphadenopathy  Pulmonary/Chest: clear to auscultation bilaterally- no wheezes, rales or rhonchi, normal air movement, no respiratory distress  Cardiovascular: normal rate, regular rhythm, normal S1 and S2, no murmurs, rubs, clicks, or gallops, distal pulses intact, no carotid bruits  Abdomen: soft, non-tender, non-distended, normal bowel sounds, no masses or organomegaly  Extremities: no cyanosis, clubbing or edema  Musculoskeletal: normal range of motion, no joint swelling, deformity or tenderness  Neurologic: reflexes normal and symmetric, no cranial nerve deficit, gait, coordination and speech normal    Patient's complete Health Risk Assessment and screening values have been reviewed and are found in Flowsheets. The following problems were reviewed today and where indicated follow up appointments were made and/or referrals ordered.     Positive Risk Factor Screenings with Interventions:     General Health and ACP:  General  In general, how would you say your health is?: Very Good  In the past 7 days, have you experienced any of the following?  New or Increased Pain, New or Increased Fatigue, Loneliness, Social Isolation, Stress or Anger?: None of These  Do you get the social and emotional support that you need?: Yes  Do you have a Living Will?: Yes  Advance Directives     Power of  Living Will ACP-Advance Directive ACP-Power of     Not on File Not on File Filed [address] Interventions:  · no issues identified    Health Habits/Nutrition:  Health Habits/Nutrition  Do you exercise for at least 20 minutes 2-3 times per week?: (!) No  Have you lost any weight without trying in the past 3 months?: No  Do you eat fewer than 2 meals per day?: No  Have you seen a dentist within the past year?: Yes  Body mass index: (!) 29.42  Health Habits/Nutrition Interventions:  · Inadequate physical activity:  patient is not ready to increase his/her physical activity level at this time    Hearing/Vision:  No exam data present  Hearing/Vision  Do you or your family notice any trouble with your hearing?: No  Do you have difficulty driving, watching TV, or doing any of your daily activities because of your eyesight?: No  Have you had an eye exam within the past year?: (!) No  Hearing/Vision Interventions:  · Vision concerns:  patient encouraged to make appointment with his/her eye specialist   · Follows with Dr. Sadya Rosales Maintenance Status  Immunization History   Administered Date(s) Administered    Influenza A (Q7Z1-60) Vaccine PF IM 12/16/2009    Influenza Vaccine, unspecified formulation 09/14/2015    Influenza Virus Vaccine 09/13/2018, 08/22/2020    Influenza, High Dose (Fluzone 65 yrs and older) 09/21/2016, 09/15/2017    Influenza, Quadv, adjuvanted, 65 yrs +, IM, PF (Fluad) 08/22/2020    Influenza, Triv, inactivated, subunit, adjuvanted, IM (Fluad 65 yrs and older) 09/13/2018, 08/08/2019    Pneumococcal Conjugate

## 2021-02-15 DIAGNOSIS — I10 ESSENTIAL HYPERTENSION: Chronic | ICD-10-CM

## 2021-02-15 RX ORDER — ATORVASTATIN CALCIUM 10 MG/1
TABLET, FILM COATED ORAL
Qty: 90 TABLET | Refills: 3 | Status: SHIPPED | OUTPATIENT
Start: 2021-02-15 | End: 2022-01-03 | Stop reason: SDUPTHER

## 2021-02-15 RX ORDER — CHLORTHALIDONE 25 MG/1
25 TABLET ORAL DAILY
Qty: 90 TABLET | Refills: 1 | Status: SHIPPED | OUTPATIENT
Start: 2021-02-15 | End: 2021-08-17

## 2021-02-15 RX ORDER — RIVAROXABAN 10 MG/1
10 TABLET, FILM COATED ORAL
Qty: 90 TABLET | Refills: 3 | Status: SHIPPED | OUTPATIENT
Start: 2021-02-15 | End: 2022-01-03 | Stop reason: ALTCHOICE

## 2021-02-15 RX ORDER — LISINOPRIL 20 MG/1
40 TABLET ORAL DAILY
Qty: 180 TABLET | Refills: 3 | Status: SHIPPED | OUTPATIENT
Start: 2021-02-15 | End: 2022-01-03 | Stop reason: SDUPTHER

## 2021-05-06 ENCOUNTER — HOSPITAL ENCOUNTER (OUTPATIENT)
Age: 81
Discharge: HOME | End: 2021-05-06
Payer: MEDICARE

## 2021-05-06 ENCOUNTER — HOSPITAL ENCOUNTER (OUTPATIENT)
Dept: ULTRASOUND IMAGING | Age: 81
Discharge: HOME | End: 2021-05-08
Payer: MEDICARE

## 2021-05-06 ENCOUNTER — OFFICE VISIT (OUTPATIENT)
Dept: PRIMARY CARE CLINIC | Age: 81
End: 2021-05-06
Payer: MEDICARE

## 2021-05-06 VITALS
RESPIRATION RATE: 22 BRPM | WEIGHT: 219.2 LBS | OXYGEN SATURATION: 99 % | SYSTOLIC BLOOD PRESSURE: 126 MMHG | BODY MASS INDEX: 30.69 KG/M2 | HEART RATE: 60 BPM | DIASTOLIC BLOOD PRESSURE: 70 MMHG | HEIGHT: 71 IN

## 2021-05-06 DIAGNOSIS — Z13.1 SCREENING FOR DIABETES MELLITUS: ICD-10-CM

## 2021-05-06 DIAGNOSIS — I82.412 DVT OF DEEP FEMORAL VEIN, LEFT (HCC): ICD-10-CM

## 2021-05-06 DIAGNOSIS — D69.6 THROMBOCYTOPENIA (HCC): ICD-10-CM

## 2021-05-06 DIAGNOSIS — M25.552 LEFT HIP PAIN: Primary | ICD-10-CM

## 2021-05-06 DIAGNOSIS — M25.552 LEFT HIP PAIN: ICD-10-CM

## 2021-05-06 LAB
ALBUMIN SERPL-MCNC: 4.3 G/DL (ref 3.5–5.2)
ALBUMIN/GLOBULIN RATIO: 1.7 (ref 1–2.5)
ALP BLD-CCNC: 60 U/L (ref 40–129)
ALT SERPL-CCNC: 21 U/L (ref 5–41)
ANION GAP SERPL CALCULATED.3IONS-SCNC: 9 MMOL/L (ref 9–17)
AST SERPL-CCNC: 22 U/L
BILIRUB SERPL-MCNC: 0.74 MG/DL (ref 0.3–1.2)
BUN BLDV-MCNC: 24 MG/DL (ref 8–23)
BUN/CREAT BLD: ABNORMAL (ref 9–20)
CALCIUM SERPL-MCNC: 8.7 MG/DL (ref 8.6–10.4)
CHLORIDE BLD-SCNC: 104 MMOL/L (ref 98–107)
CO2: 27 MMOL/L (ref 20–31)
CREAT SERPL-MCNC: 1.29 MG/DL (ref 0.7–1.2)
GFR AFRICAN AMERICAN: >60 ML/MIN
GFR NON-AFRICAN AMERICAN: 53 ML/MIN
GFR SERPL CREATININE-BSD FRML MDRD: ABNORMAL ML/MIN/{1.73_M2}
GFR SERPL CREATININE-BSD FRML MDRD: ABNORMAL ML/MIN/{1.73_M2}
GLUCOSE BLD-MCNC: 85 MG/DL (ref 70–99)
HCT VFR BLD CALC: 47.8 % (ref 40.7–50.3)
HEMOGLOBIN: 15.5 G/DL (ref 13–17)
MCH RBC QN AUTO: 31.7 PG (ref 25.2–33.5)
MCHC RBC AUTO-ENTMCNC: 32.4 G/DL (ref 28.4–34.8)
MCV RBC AUTO: 97.8 FL (ref 82.6–102.9)
NRBC AUTOMATED: 0 PER 100 WBC
PDW BLD-RTO: 12.6 % (ref 11.8–14.4)
PLATELET # BLD: 149 K/UL (ref 138–453)
PMV BLD AUTO: 10.9 FL (ref 8.1–13.5)
POTASSIUM SERPL-SCNC: 4.3 MMOL/L (ref 3.7–5.3)
RBC # BLD: 4.89 M/UL (ref 4.21–5.77)
SODIUM BLD-SCNC: 140 MMOL/L (ref 135–144)
TOTAL PROTEIN: 6.9 G/DL (ref 6.4–8.3)
WBC # BLD: 6.1 K/UL (ref 3.5–11.3)

## 2021-05-06 PROCEDURE — 36415 COLL VENOUS BLD VENIPUNCTURE: CPT

## 2021-05-06 PROCEDURE — 1036F TOBACCO NON-USER: CPT | Performed by: NURSE PRACTITIONER

## 2021-05-06 PROCEDURE — G8427 DOCREV CUR MEDS BY ELIG CLIN: HCPCS | Performed by: NURSE PRACTITIONER

## 2021-05-06 PROCEDURE — 80053 COMPREHEN METABOLIC PANEL: CPT

## 2021-05-06 PROCEDURE — G8417 CALC BMI ABV UP PARAM F/U: HCPCS | Performed by: NURSE PRACTITIONER

## 2021-05-06 PROCEDURE — 85027 COMPLETE CBC AUTOMATED: CPT

## 2021-05-06 PROCEDURE — 4040F PNEUMOC VAC/ADMIN/RCVD: CPT | Performed by: NURSE PRACTITIONER

## 2021-05-06 PROCEDURE — 99214 OFFICE O/P EST MOD 30 MIN: CPT | Performed by: NURSE PRACTITIONER

## 2021-05-06 PROCEDURE — 1123F ACP DISCUSS/DSCN MKR DOCD: CPT | Performed by: NURSE PRACTITIONER

## 2021-05-06 PROCEDURE — 93971 EXTREMITY STUDY: CPT

## 2021-05-06 RX ORDER — TIZANIDINE 4 MG/1
4 TABLET ORAL 3 TIMES DAILY
Qty: 30 TABLET | Refills: 0 | Status: SHIPPED | OUTPATIENT
Start: 2021-05-06 | End: 2022-01-03 | Stop reason: ALTCHOICE

## 2021-05-06 RX ORDER — METHYLPREDNISOLONE 4 MG/1
4 TABLET ORAL SEE ADMIN INSTRUCTIONS
Qty: 1 KIT | Refills: 0 | Status: SHIPPED | OUTPATIENT
Start: 2021-05-06 | End: 2022-01-03

## 2021-05-06 ASSESSMENT — PATIENT HEALTH QUESTIONNAIRE - PHQ9
SUM OF ALL RESPONSES TO PHQ QUESTIONS 1-9: 0
SUM OF ALL RESPONSES TO PHQ9 QUESTIONS 1 & 2: 0
1. LITTLE INTEREST OR PLEASURE IN DOING THINGS: 0

## 2021-05-06 ASSESSMENT — ENCOUNTER SYMPTOMS
SHORTNESS OF BREATH: 0
ABDOMINAL PAIN: 0
COUGH: 0
BACK PAIN: 0

## 2021-05-06 NOTE — PROGRESS NOTES
[address] PRIMARY CARE  [address]    [address]  [address] [address]  Dept: [phone]  Dept Fax: [phone]    Jt Gil is a 79 y.o. male who presentstoday for his medical conditions/complaints as noted below. Jt Gil is c/o of  Chief Complaint   Patient presents with    Hip Pain     x1 week hip pain. Difficulty with walking. HPI:     Presents with wife for 6 month recheck on chronic conditions  BP well controlled  Has gained 9lb since last visit    C/o left hip pain beginning on   Denies any fall or injury  Affecting ability to walk and get in/out of car  Concerned regarding his hx of DVT  Using xarelto- denies missing any doses    Willing to update labs    Denies any other problems/concerns      No results found for: LABA1C          ( goal A1C is < 7)   No results found for: LABMICR  LDL Cholesterol (mg/dL)   Date Value   2020 81   2019 88   2017 99     LDL Calculated (mg/dL)   Date Value   2013 86       (goal LDL is <100)   AST (U/L)   Date Value   2020 20     ALT (U/L)   Date Value   2020 19     BUN (mg/dL)   Date Value   2020 20     BP Readings from Last 3 Encounters:   21 126/70   20 134/72   20 132/76          ([id number])    Past Medical History:   Diagnosis Date    Cancer [address] [address]     basal cell    Diverticulosis     coli    Plantar fasciitis       Past Surgical History:   Procedure Laterality Date    CATARACT REMOVAL WITH IMPLANT Bilateral may 2013    COLONOSCOPY      HERNIA REPAIR      SKIN CANCER EXCISION      multi sites   [place]         History reviewed. No pertinent family history.        Social History     Tobacco Use    Smoking status: Former Smoker     Packs/day: 1.50     Years: 22.00     Pack years: 33.00     Types: Cigarettes     Quit date: 1981     Years since quittin.3    Smokeless tobacco: Never Used   Substance Use Topics    Alcohol use: Yes     Comment: 1 drink daily      Current Outpatient Medications   Medication Sig Dispense Refill    atorvastatin (LIPITOR) 10 MG tablet TAKE 1 TABLET BY MOUTH ONCE DAILY 90 tablet 3    lisinopril (PRINIVIL;ZESTRIL) 20 MG tablet Take 2 tablets by mouth daily 180 tablet 3    rivaroxaban (XARELTO) 10 MG TABS tablet Take 1 tablet by mouth daily (with breakfast) 90 tablet 3    chlorthalidone (HYGROTON) 25 MG tablet Take 1 tablet by mouth daily 90 tablet 1    atenolol (TENORMIN) 25 MG tablet Take 1 tablet by mouth 2 times daily 180 tablet 3    Multiple Vitamins-Minerals (THERAPEUTIC MULTIVITAMIN-MINERALS) tablet Take 1 tablet by mouth daily. No current facility-administered medications for this visit. Allergies   Allergen Reactions    Sulfa Antibiotics        Health Maintenance   Topic Date Due    Potassium monitoring  01/20/2021    Creatinine monitoring  01/20/2021    Lipid screen  11/03/2021    Annual Wellness Visit (AWV)  11/05/2021    DTaP/Tdap/Td vaccine (2 - Td) 11/15/2026    Flu vaccine  Completed    Shingles Vaccine  Completed    Pneumococcal 65+ years Vaccine  Completed    COVID-19 Vaccine  Completed    Hepatitis A vaccine  Aged Out    Hepatitis B vaccine  Aged Out    Hib vaccine  Aged Out    Meningococcal (ACWY) vaccine  Aged Out       Subjective:      Review of Systems   Constitutional: Negative for chills, fatigue and fever. HENT: Negative for congestion. Eyes: Negative for visual disturbance. Respiratory: Negative for cough and shortness of breath. Cardiovascular: Negative for chest pain and palpitations. Gastrointestinal: Negative for abdominal pain. Genitourinary: Negative for difficulty urinating and dysuria. Musculoskeletal: Positive for arthralgias. Negative for back pain. Neurological: Negative for dizziness (at times, occuring once monthly) and headaches. Psychiatric/Behavioral: Negative for self-injury, sleep disturbance and suicidal ideas.  The patient is not nervous/anxious. Objective:     Physical Exam  Vitals signs and nursing note reviewed. Constitutional:       Appearance: He is well-developed. HENT:      Head: Normocephalic and atraumatic. Eyes:      Pupils: Pupils are equal, round, and reactive to light. Neck:      Musculoskeletal: Normal range of motion. Cardiovascular:      Rate and Rhythm: Normal rate and regular rhythm. Heart sounds: Normal heart sounds. Pulmonary:      Effort: Pulmonary effort is normal.      Breath sounds: Normal breath sounds. Abdominal:      General: Bowel sounds are normal.      Palpations: Abdomen is soft. Tenderness: There is no abdominal tenderness. Musculoskeletal: Normal range of motion. Left hip: He exhibits tenderness. Legs:    Skin:     General: Skin is warm and dry. Neurological:      Mental Status: He is alert and oriented to person, place, and time. Psychiatric:         Behavior: Behavior normal.         Thought Content: Thought content normal.         Judgment: Judgment normal.       /70 (Site: Left Upper Arm, Position: Sitting, Cuff Size: Large Adult)   Pulse 60   Resp 22   Ht 5' 10.5\" (1.791 m)   Wt 219 lb 3.2 oz (99.4 kg)   SpO2 99%   BMI 31.01 kg/m²     Assessment:       Diagnosis Orders   1. Left hip pain  US DUP LOWER EXTREMITY RIGHT LENO   2. Thrombocytopenia (HCC)  CBC   3. DVT of deep femoral vein, left ([address]  US DUP LOWER EXTREMITY RIGHT LENO   4. Screening for diabetes mellitus  Comprehensive Metabolic Panel             Plan:      Return in about 6 months (around 11/6/2021) for AWV. 1. Left hip pain- Rx given for US stat, follow up pending results. If positive will refer back to Dr. Radha Talavera for further eval. If WNL will treat as musculoskeletal with prednisone and zanaflex. Follow pending results. 2. Chronic conditions- Stable. Continue current meds. Rx given for labs. Follow up in six months for recheck/AWV.     Orders Placed This Encounter Procedures    US DUP LOWER EXTREMITY RIGHT LENO     Standing Status:   Future     Standing Expiration Date:   5/6/2022     Order Specific Question:   Reason for exam:     Answer:   new onset left hip pain, hx of DVT    Comprehensive Metabolic Panel     Standing Status:   Future     Standing Expiration Date:   5/6/2022    CBC     Standing Status:   Future     Standing Expiration Date:   5/6/2022          Patient given educational materials - see patient instructions. Discussed use, benefit, and side effects of prescribed medications. All patientquestions answered. Pt voiced understanding. Reviewed health maintenance. Instructedto continue current medications, diet and exercise. Patient agreed with treatmentplan. Follow up as directed.      Electronicallysigned by BOBBY Vasquez CNP on 5/6/2021 at 2:46 PM

## 2021-05-07 ENCOUNTER — TELEPHONE (OUTPATIENT)
Dept: PRIMARY CARE CLINIC | Age: 81
End: 2021-05-07

## 2021-05-07 NOTE — TELEPHONE ENCOUNTER
Patient called for results of venous scan-gave results but he states they were going to scan his right leg and he had to tell them it was his left left. He is asking which leg he had DVT in? Writer does not see results of a DVT-only vein ablations. His is asking if he still needs to be on Xarelto because is very expensive.

## 2021-05-07 NOTE — TELEPHONE ENCOUNTER
The DVT Dr. Lucas Sheikh noted was in the left leg  I do not feel comfortable stopping the xarelto- please advise patient to contact Dr. Lucas Sheikh to schedule a follow up- this is who would better be able to determine whether or not he can stop the blood thinner  thanks

## 2021-05-10 ENCOUNTER — TELEPHONE (OUTPATIENT)
Dept: PRIMARY CARE CLINIC | Age: 81
End: 2021-05-10

## 2021-05-10 NOTE — TELEPHONE ENCOUNTER
Dr. Landa Doctor from [facility] called stating that patient called their office stating that you wanted to discuss stopping his Xarelto. Dr. Syeda Pandya is requesting you call at your convenience to further discuss.      [phone]

## 2021-05-10 NOTE — TELEPHONE ENCOUNTER
Call to Pt he stated he doesn't know why he is taking Xarelto when he doesn't have a blood clot . Pt stated it's expensive . Pt also stated he talked to  office today and they never mentioned hre needed to Follow up . Pt also seen  3 weeks ago .  Please advise

## 2021-05-10 NOTE — TELEPHONE ENCOUNTER
I spoke with Dr. Balwinder Ng. He needs appt to discuss further anticoagulation  She would like him to reach out to Dr. Nathan Giles to review need for further labs    I spoke with patient and advised above- he will call to schedule with Dr. Balwinder Ng    I had the opportunity to speak with Dr. Nathan Giles. He would like him to schedule a virtual visit to discuss additional labs. Please have patient call to schedule.  thanks

## 2021-07-19 ENCOUNTER — HOSPITAL ENCOUNTER (OUTPATIENT)
Age: 81
Discharge: HOME | End: 2021-07-19
Payer: MEDICARE

## 2021-07-19 LAB
ABSOLUTE EOS #: 0.13 K/UL (ref 0–0.44)
ABSOLUTE IMMATURE GRANULOCYTE: <0.03 K/UL (ref 0–0.3)
ABSOLUTE LYMPH #: 1.14 K/UL (ref 1.1–3.7)
ABSOLUTE MONO #: 0.63 K/UL (ref 0.1–1.2)
ALBUMIN SERPL-MCNC: 4.2 G/DL (ref 3.5–5.2)
ALBUMIN/GLOBULIN RATIO: 1.5 (ref 1–2.5)
ALP BLD-CCNC: 64 U/L (ref 40–129)
ALT SERPL-CCNC: 19 U/L (ref 5–41)
ANION GAP SERPL CALCULATED.3IONS-SCNC: 11 MMOL/L (ref 9–17)
AST SERPL-CCNC: 21 U/L
BASOPHILS # BLD: 1 % (ref 0–2)
BASOPHILS ABSOLUTE: 0.05 K/UL (ref 0–0.2)
BILIRUB SERPL-MCNC: 0.89 MG/DL (ref 0.3–1.2)
BUN BLDV-MCNC: 30 MG/DL (ref 8–23)
BUN/CREAT BLD: ABNORMAL (ref 9–20)
CALCIUM SERPL-MCNC: 9.1 MG/DL (ref 8.6–10.4)
CHLORIDE BLD-SCNC: 104 MMOL/L (ref 98–107)
CO2: 25 MMOL/L (ref 20–31)
CREAT SERPL-MCNC: 0.99 MG/DL (ref 0.7–1.2)
D-DIMER QUANTITATIVE: 0.58 MG/L FEU
DIFFERENTIAL TYPE: ABNORMAL
EOSINOPHILS RELATIVE PERCENT: 2 % (ref 1–4)
GFR AFRICAN AMERICAN: >60 ML/MIN
GFR NON-AFRICAN AMERICAN: >60 ML/MIN
GFR SERPL CREATININE-BSD FRML MDRD: ABNORMAL ML/MIN/{1.73_M2}
GFR SERPL CREATININE-BSD FRML MDRD: ABNORMAL ML/MIN/{1.73_M2}
GLUCOSE BLD-MCNC: 82 MG/DL (ref 70–99)
HCT VFR BLD CALC: 47.1 % (ref 40.7–50.3)
HEMOGLOBIN: 15.4 G/DL (ref 13–17)
IMMATURE GRANULOCYTES: 0 %
LYMPHOCYTES # BLD: 20 % (ref 24–43)
MCH RBC QN AUTO: 31.7 PG (ref 25.2–33.5)
MCHC RBC AUTO-ENTMCNC: 32.7 G/DL (ref 28.4–34.8)
MCV RBC AUTO: 96.9 FL (ref 82.6–102.9)
MONOCYTES # BLD: 11 % (ref 3–12)
NRBC AUTOMATED: 0 PER 100 WBC
PDW BLD-RTO: 12.9 % (ref 11.8–14.4)
PLATELET # BLD: 149 K/UL (ref 138–453)
PLATELET ESTIMATE: ABNORMAL
PMV BLD AUTO: 10.7 FL (ref 8.1–13.5)
POTASSIUM SERPL-SCNC: 4.7 MMOL/L (ref 3.7–5.3)
RBC # BLD: 4.86 M/UL (ref 4.21–5.77)
RBC # BLD: ABNORMAL 10*6/UL
SEG NEUTROPHILS: 66 % (ref 36–65)
SEGMENTED NEUTROPHILS ABSOLUTE COUNT: 3.68 K/UL (ref 1.5–8.1)
SODIUM BLD-SCNC: 140 MMOL/L (ref 135–144)
TOTAL PROTEIN: 7 G/DL (ref 6.4–8.3)
WBC # BLD: 5.6 K/UL (ref 3.5–11.3)
WBC # BLD: ABNORMAL 10*3/UL

## 2021-07-19 PROCEDURE — 85730 THROMBOPLASTIN TIME PARTIAL: CPT

## 2021-07-19 PROCEDURE — 85025 COMPLETE CBC W/AUTO DIFF WBC: CPT

## 2021-07-19 PROCEDURE — 85306 CLOT INHIBIT PROT S FREE: CPT

## 2021-07-19 PROCEDURE — 85300 ANTITHROMBIN III ACTIVITY: CPT

## 2021-07-19 PROCEDURE — 80053 COMPREHEN METABOLIC PANEL: CPT

## 2021-07-19 PROCEDURE — 85613 RUSSELL VIPER VENOM DILUTED: CPT

## 2021-07-19 PROCEDURE — 85379 FIBRIN DEGRADATION QUANT: CPT

## 2021-07-19 PROCEDURE — 85610 PROTHROMBIN TIME: CPT

## 2021-07-19 PROCEDURE — 81241 F5 GENE: CPT

## 2021-07-19 PROCEDURE — 85303 CLOT INHIBIT PROT C ACTIVITY: CPT

## 2021-07-19 PROCEDURE — 86147 CARDIOLIPIN ANTIBODY EA IG: CPT

## 2021-07-19 PROCEDURE — 36415 COLL VENOUS BLD VENIPUNCTURE: CPT

## 2021-07-19 PROCEDURE — 86146 BETA-2 GLYCOPROTEIN ANTIBODY: CPT

## 2021-07-19 PROCEDURE — 81240 F2 GENE: CPT

## 2021-07-20 ENCOUNTER — HOSPITAL ENCOUNTER (OUTPATIENT)
Age: 81
Discharge: HOME | End: 2021-07-20
Payer: MEDICARE

## 2021-07-20 LAB
ANTICARDIOLIPIN IGA ANTIBODY: 3.6 APL (ref 0–14)
ANTICARDIOLIPIN IGG ANTIBODY: 17 GPL (ref 0–10)
BETA 2 GLYCOPROT.1 IGA AB: 6.2 ELISA U/ML (ref 0–7)
BETA 2 GLYCOPROT.1 IGG AB: 21 ELISA U/ML (ref 0–7)
BETA 2 GLYCOPROT.1 IGM AB: <0.9 ELISA U/ML (ref 0–7)
CARDIOLIPIN AB IGM: 72 MPL (ref 0–10)
DILUTE RUSSELL VIPER VENOM TIME: ABNORMAL
HOMOCYSTEINE: 10.6 UMOL/L
INR BLD: 1.2
LUPUS ANTICOAG: ABNORMAL
PARTIAL THROMBOPLASTIN TIME: 28.8 SEC (ref 21.3–31.3)
PROTHROMBIN TIME: 12.4 SEC (ref 9.4–12.6)

## 2021-07-20 PROCEDURE — 36415 COLL VENOUS BLD VENIPUNCTURE: CPT

## 2021-07-20 PROCEDURE — 83090 ASSAY OF HOMOCYSTEINE: CPT

## 2021-07-21 LAB
-: NORMAL
AT-III ACTIVITY: 90 % (ref 83–122)
PROTEIN C ACTIVITY: 90 %
PROTEIN S ACTIVITY: >130 % (ref 77–116)
REASON FOR REJECTION: NORMAL
ZZ NTE CLEAN UP: ORDERED TEST: NORMAL
ZZ NTE WITH NAME CLEAN UP: SPECIMEN SOURCE: NORMAL

## 2021-07-22 LAB
FACTOR V MUTATION: NEGATIVE
SPECIMEN: NORMAL

## 2021-07-23 LAB
PROTHROMBIN G20210A MUTATION: NEGATIVE
PT PCR SPECIMEN: NORMAL

## 2021-08-17 RX ORDER — CHLORTHALIDONE 25 MG/1
TABLET ORAL
Qty: 90 TABLET | Refills: 0 | Status: SHIPPED | OUTPATIENT
Start: 2021-08-17 | End: 2021-11-24

## 2021-08-31 ENCOUNTER — TELEPHONE (OUTPATIENT)
Dept: PRIMARY CARE CLINIC | Age: 81
End: 2021-08-31

## 2021-08-31 NOTE — TELEPHONE ENCOUNTER
Pt & his wife are going to receive their flu vaccines soon & would like to know if he is due for a pneumonia vaccine. His last [id number] was in 2016 but his chart does not indicate that he is due for another, please advise. Pt would also like to know if he should be getting a COVID19 booster. Pt states he heard 8mos but Dr Anthony Paiz indicated people shouldn't wait. Please advise.

## 2021-08-31 NOTE — TELEPHONE ENCOUNTER
He will not need the pneumonia vaccine, that is complete  Flu vaccine can be done anytime now  Hold on covid booster, CDC has not issued an official recommendation

## 2021-09-21 ENCOUNTER — HOSPITAL ENCOUNTER (OUTPATIENT)
Dept: CT IMAGING | Age: 81
Discharge: HOME | End: 2021-09-23
Payer: MEDICARE

## 2021-09-21 DIAGNOSIS — D69.6 ACQUIRED THROMBOCYTOPENIA (HCC): ICD-10-CM

## 2021-09-21 LAB
CREAT SERPL-MCNC: 1.02 MG/DL (ref 0.7–1.2)
GFR AFRICAN AMERICAN: >60 ML/MIN
GFR NON-AFRICAN AMERICAN: >60 ML/MIN
GFR SERPL CREATININE-BSD FRML MDRD: NORMAL ML/MIN/{1.73_M2}
GFR SERPL CREATININE-BSD FRML MDRD: NORMAL ML/MIN/{1.73_M2}

## 2021-09-21 PROCEDURE — 6360000004 HC RX CONTRAST MEDICATION: Performed by: INTERNAL MEDICINE

## 2021-09-21 PROCEDURE — 36415 COLL VENOUS BLD VENIPUNCTURE: CPT

## 2021-09-21 PROCEDURE — 2580000003 HC RX 258: Performed by: INTERNAL MEDICINE

## 2021-09-21 PROCEDURE — 82565 ASSAY OF CREATININE: CPT

## 2021-09-21 PROCEDURE — 74177 CT ABD & PELVIS W/CONTRAST: CPT

## 2021-09-21 RX ORDER — 0.9 % SODIUM CHLORIDE 0.9 %
80 INTRAVENOUS SOLUTION INTRAVENOUS ONCE
Status: COMPLETED | OUTPATIENT
Start: 2021-09-21 | End: 2021-09-21

## 2021-09-21 RX ORDER — SODIUM CHLORIDE 0.9 % (FLUSH) 0.9 %
10 SYRINGE (ML) INJECTION PRN
Status: DISCONTINUED | OUTPATIENT
Start: 2021-09-21 | End: 2021-09-24 | Stop reason: HOSPADM

## 2021-09-21 RX ADMIN — IOHEXOL 50 ML: 240 INJECTION, SOLUTION INTRATHECAL; INTRAVASCULAR; INTRAVENOUS; ORAL at 12:02

## 2021-09-21 RX ADMIN — IOPAMIDOL 75 ML: 755 INJECTION, SOLUTION INTRAVENOUS at 12:02

## 2021-09-21 RX ADMIN — SODIUM CHLORIDE, PRESERVATIVE FREE 10 ML: 5 INJECTION INTRAVENOUS at 12:02

## 2021-09-21 RX ADMIN — SODIUM CHLORIDE 80 ML: 9 INJECTION, SOLUTION INTRAVENOUS at 12:02

## 2021-10-05 DIAGNOSIS — I10 ESSENTIAL HYPERTENSION: Chronic | ICD-10-CM

## 2021-10-05 RX ORDER — ATENOLOL 25 MG/1
25 TABLET ORAL DAILY
Qty: 90 TABLET | Refills: 3 | Status: SHIPPED | OUTPATIENT
Start: 2021-10-05 | End: 2021-11-11 | Stop reason: SDUPTHER

## 2021-11-11 ENCOUNTER — OFFICE VISIT (OUTPATIENT)
Dept: PRIMARY CARE CLINIC | Age: 81
End: 2021-11-11
Payer: MEDICARE

## 2021-11-11 ENCOUNTER — HOSPITAL ENCOUNTER (OUTPATIENT)
Age: 81
Setting detail: SPECIMEN
Discharge: HOME | End: 2021-11-11
Payer: MEDICARE

## 2021-11-11 VITALS
OXYGEN SATURATION: 95 % | WEIGHT: 220.2 LBS | DIASTOLIC BLOOD PRESSURE: 84 MMHG | BODY MASS INDEX: 30.83 KG/M2 | HEIGHT: 71 IN | RESPIRATION RATE: 13 BRPM | SYSTOLIC BLOOD PRESSURE: 116 MMHG | HEART RATE: 65 BPM

## 2021-11-11 DIAGNOSIS — Z13.29 SCREENING FOR THYROID DISORDER: ICD-10-CM

## 2021-11-11 DIAGNOSIS — Z13.220 SCREENING FOR LIPID DISORDERS: ICD-10-CM

## 2021-11-11 DIAGNOSIS — Z00.00 ENCOUNTER FOR GENERAL ADULT MEDICAL EXAMINATION W/O ABNORMAL FINDINGS: Primary | ICD-10-CM

## 2021-11-11 DIAGNOSIS — Z13.0 SCREENING FOR DEFICIENCY ANEMIA: ICD-10-CM

## 2021-11-11 DIAGNOSIS — Z12.5 SCREENING FOR PROSTATE CANCER: ICD-10-CM

## 2021-11-11 DIAGNOSIS — Z13.1 SCREENING FOR DIABETES MELLITUS: ICD-10-CM

## 2021-11-11 DIAGNOSIS — I82.412 DVT OF DEEP FEMORAL VEIN, LEFT (HCC): ICD-10-CM

## 2021-11-11 DIAGNOSIS — D69.6 THROMBOCYTOPENIA (HCC): ICD-10-CM

## 2021-11-11 LAB
ALBUMIN SERPL-MCNC: 4.1 G/DL (ref 3.5–5.2)
ALBUMIN/GLOBULIN RATIO: 1.7 (ref 1–2.5)
ALP BLD-CCNC: 60 U/L (ref 40–129)
ALT SERPL-CCNC: 19 U/L (ref 5–41)
ANION GAP SERPL CALCULATED.3IONS-SCNC: 14 MMOL/L (ref 9–17)
AST SERPL-CCNC: 19 U/L
BILIRUB SERPL-MCNC: 0.78 MG/DL (ref 0.3–1.2)
BUN BLDV-MCNC: 23 MG/DL (ref 8–23)
BUN/CREAT BLD: ABNORMAL (ref 9–20)
CALCIUM SERPL-MCNC: 8.9 MG/DL (ref 8.6–10.4)
CHLORIDE BLD-SCNC: 106 MMOL/L (ref 98–107)
CHOLESTEROL/HDL RATIO: 2.6
CHOLESTEROL: 152 MG/DL
CO2: 22 MMOL/L (ref 20–31)
CREAT SERPL-MCNC: 1.19 MG/DL (ref 0.7–1.2)
GFR AFRICAN AMERICAN: >60 ML/MIN
GFR NON-AFRICAN AMERICAN: 59 ML/MIN
GFR SERPL CREATININE-BSD FRML MDRD: ABNORMAL ML/MIN/{1.73_M2}
GFR SERPL CREATININE-BSD FRML MDRD: ABNORMAL ML/MIN/{1.73_M2}
GLUCOSE BLD-MCNC: 104 MG/DL (ref 70–99)
HCT VFR BLD CALC: 46.6 % (ref 40.7–50.3)
HDLC SERPL-MCNC: 59 MG/DL
HEMOGLOBIN: 15.3 G/DL (ref 13–17)
LDL CHOLESTEROL: 72 MG/DL (ref 0–130)
MCH RBC QN AUTO: 31.7 PG (ref 25.2–33.5)
MCHC RBC AUTO-ENTMCNC: 32.8 G/DL (ref 28.4–34.8)
MCV RBC AUTO: 96.7 FL (ref 82.6–102.9)
NRBC AUTOMATED: 0 PER 100 WBC
PDW BLD-RTO: 12.7 % (ref 11.8–14.4)
PLATELET # BLD: NORMAL K/UL (ref 138–453)
PLATELET, FLUORESCENCE: 135 K/UL (ref 138–453)
PLATELET, IMMATURE FRACTION: 4 % (ref 1.1–10.3)
PMV BLD AUTO: NORMAL FL (ref 8.1–13.5)
POTASSIUM SERPL-SCNC: 4.1 MMOL/L (ref 3.7–5.3)
PROSTATE SPECIFIC ANTIGEN: 3.72 UG/L
RBC # BLD: 4.82 M/UL (ref 4.21–5.77)
SODIUM BLD-SCNC: 142 MMOL/L (ref 135–144)
TOTAL PROTEIN: 6.5 G/DL (ref 6.4–8.3)
TRIGL SERPL-MCNC: 106 MG/DL
TSH SERPL DL<=0.05 MIU/L-ACNC: 1.97 MIU/L (ref 0.3–5)
VLDLC SERPL CALC-MCNC: NORMAL MG/DL (ref 1–30)
WBC # BLD: 5.3 K/UL (ref 3.5–11.3)

## 2021-11-11 PROCEDURE — 4040F PNEUMOC VAC/ADMIN/RCVD: CPT | Performed by: NURSE PRACTITIONER

## 2021-11-11 PROCEDURE — 1123F ACP DISCUSS/DSCN MKR DOCD: CPT | Performed by: NURSE PRACTITIONER

## 2021-11-11 PROCEDURE — G8484 FLU IMMUNIZE NO ADMIN: HCPCS | Performed by: NURSE PRACTITIONER

## 2021-11-11 PROCEDURE — G0439 PPPS, SUBSEQ VISIT: HCPCS | Performed by: NURSE PRACTITIONER

## 2021-11-11 SDOH — ECONOMIC STABILITY: FOOD INSECURITY: WITHIN THE PAST 12 MONTHS, THE FOOD YOU BOUGHT JUST DIDN'T LAST AND YOU DIDN'T HAVE MONEY TO GET MORE.: NEVER TRUE

## 2021-11-11 SDOH — ECONOMIC STABILITY: FOOD INSECURITY: WITHIN THE PAST 12 MONTHS, YOU WORRIED THAT YOUR FOOD WOULD RUN OUT BEFORE YOU GOT MONEY TO BUY MORE.: NEVER TRUE

## 2021-11-11 ASSESSMENT — LIFESTYLE VARIABLES: HOW OFTEN DO YOU HAVE A DRINK CONTAINING ALCOHOL: 0

## 2021-11-11 ASSESSMENT — PATIENT HEALTH QUESTIONNAIRE - PHQ9
2. FEELING DOWN, DEPRESSED OR HOPELESS: 0
1. LITTLE INTEREST OR PLEASURE IN DOING THINGS: 0
SUM OF ALL RESPONSES TO PHQ QUESTIONS 1-9: 0
SUM OF ALL RESPONSES TO PHQ9 QUESTIONS 1 & 2: 0

## 2021-11-11 ASSESSMENT — SOCIAL DETERMINANTS OF HEALTH (SDOH): HOW HARD IS IT FOR YOU TO PAY FOR THE VERY BASICS LIKE FOOD, HOUSING, MEDICAL CARE, AND HEATING?: NOT HARD AT ALL

## 2021-11-11 NOTE — PROGRESS NOTES
Medicare Annual Wellness Visit  Name: Gretel Francois Date: 2021   MRN: [id number] Sex: Male   Age: 79 y.o. Ethnicity: Non- / Non    : 1940 Race: White (non-)      Dewayne Dunbar is here for Medicare AWV    Screenings for behavioral, psychosocial and functional/safety risks, and cognitive dysfunction are all negative except as indicated below. These results, as well as other patient data from the [address] form, are documented in Flowsheets linked to this Encounter. Allergies   Allergen Reactions    Sulfa Antibiotics        Prior to Visit Medications    Medication Sig Taking? Authorizing Provider   Lactobacillus (PROBIOTIC ACIDOPHILUS PO) Take by mouth Yes Historical Provider, MD   chlorthalidone (HYGROTON) 25 MG tablet Take 1 tablet by mouth once daily Yes BOBBY Mancia CNP   atorvastatin (LIPITOR) 10 MG tablet TAKE 1 TABLET BY MOUTH ONCE DAILY Yes BOBBY Mancia CNP   lisinopril (PRINIVIL;ZESTRIL) 20 MG tablet Take 2 tablets by mouth daily Yes BOBBY Mancia CNP   atenolol (TENORMIN) 25 MG tablet Take 1 tablet by mouth 2 times daily Yes BOBBY Mancia CNP   Multiple Vitamins-Minerals (THERAPEUTIC MULTIVITAMIN-MINERALS) tablet Take 1 tablet by mouth daily. Yes Historical Provider, MD   methylPREDNISolone (MEDROL DOSEPACK) 4 MG tablet Take 1 tablet by mouth See Admin Instructions Take by mouth.   Patient not taking: Reported on 2021  BOBBY Mancia CNP   tiZANidine (ZANAFLEX) 4 MG tablet Take 1 tablet by mouth 3 times daily  Patient not taking: Reported on 2021  BOBBY Mancia CNP   rivaroxaban (XARELTO) 10 MG TABS tablet Take 1 tablet by mouth daily (with breakfast)  Patient not taking: Reported on 2021  BOBBY Mancia CNP       Past Medical History:   Diagnosis Date    Cancer [facility])     basal cell    Diverticulosis     coli    Plantar fasciitis        Past deformity or tenderness  Neurologic: reflexes normal and symmetric, no cranial nerve deficit, gait, coordination and speech normal    Patient's complete Health Risk Assessment and screening values have been reviewed and are found in Flowsheets. The following problems were reviewed today and where indicated follow up appointments were made and/or referrals ordered. Positive Risk Factor Screenings with Interventions:          General Health and ACP:  General  In general, how would you say your health is?: Good  In the past 7 days, have you experienced any of the following?  New or Increased Pain, New or Increased Fatigue, Loneliness, Social Isolation, Stress or Anger?: None of These  Do you get the social and emotional support that you need?: Yes  Do you have a Living Will?: Yes  Advance Directives     Power of [facility] Will ACP-Advance Directive ACP-Power of     Not on File Not on File Not on File Not on File      General Health Risk Interventions:  · no issues identified    Health Habits/Nutrition:  Health Habits/Nutrition  Do you exercise for at least 20 minutes 2-3 times per week?: (!) No  Have you lost any weight without trying in the past 3 months?: No  Do you eat only one meal per day?: No  Have you seen the dentist within the past year?: Yes  Body mass index: (!) 31.15  Health Habits/Nutrition Interventions:  · Inadequate physical activity:  educational materials provided to promote increased physical activity    Hearing/Vision:  No exam data present  Hearing/Vision  Do you or your family notice any trouble with your hearing that hasn't been managed with hearing aids?: No  Do you have difficulty driving, watching TV, or doing any of your daily activities because of your eyesight?: No  Have you had an eye exam within the past year?: (!) No  Hearing/Vision Interventions:  · Vision concerns:  patient encouraged to make appointment with his/her eye specialist    Safety:  Safety  Do you have working smoke detectors?: Yes  Have all throw rugs been removed or fastened?: Yes  Do you have non-slip mats or surfaces in all bathtubs/showers?: (!) No  Do all of your stairways have a railing or banister?: Yes  Are your doorways, halls and stairs free of clutter?: Yes  Do you always fasten your seatbelt when you are in a car?: Yes  Safety Interventions:  · Home safety tips provided     Personalized Preventive Plan   Current Health Maintenance Status  Immunization History   Administered Date(s) Administered    COVID-19, Albino Palmer PF, 30mcg/0.3mL 01/23/2021, 02/12/2021, 09/24/2021    Influenza A (V5M5-23) Vaccine PF IM 12/16/2009    Influenza Vaccine, unspecified formulation 09/14/2015    Influenza Virus Vaccine 09/13/2018, 08/22/2020    Influenza, High Dose (Fluzone 65 yrs and older) 09/21/2016, 09/15/2017    Influenza, Quadv, IM, PF (6 mo and older Fluzone, Flulaval, Fluarix, and 3 yrs and older Afluria) 08/22/2020    Influenza, Quadv, adjuvanted, 65 yrs +, IM, PF (Fluad) 08/22/2020    Influenza, Triv, inactivated, subunit, adjuvanted, IM (Fluad 65 yrs and older) 09/13/2018, 08/08/2019    Pneumococcal Conjugate 13-valent ([id number]) 11/04/2015    Pneumococcal Conjugate 7-valent (Prevnar7) 01/01/2005    Pneumococcal Polysaccharide ([id number]) 11/02/2005, 11/15/2016    Tdap (Boostrix, Adacel) 11/15/2016    Zoster Recombinant (Shingrix) 03/09/2019, 05/29/2019        Health Maintenance   Topic Date Due    Annual Wellness Visit (AWV)  11/05/2021    Lipid screen  11/03/2021    Flu vaccine (1) 11/11/2022 (Originally 9/1/2021)    Potassium monitoring  07/19/2022    Creatinine monitoring  09/21/2022    DTaP/Tdap/Td vaccine (2 - Td or Tdap) 11/15/2026    Shingles Vaccine  Completed    Pneumococcal 65+ years Vaccine  Completed    COVID-19 Vaccine  Completed    Hepatitis A vaccine  Aged Out    Hepatitis B vaccine  Aged Out    Hib vaccine  Aged Out    Meningococcal (ACWY) vaccine  Aged Out Recommendations for Preventive Services Due: see orders and patient instructions/AVS.  . Recommended screening schedule for the next 5-10 years is provided to the patient in written form: see Patient Instructions/AVS.    Nabil Sparks was seen today for medicare awv. Diagnoses and all orders for this visit:    Encounter for general adult medical examination w/o abnormal findings    Screening for deficiency anemia  -     CBC; Future    Screening for lipid disorders  -     Lipid Panel; Future    Screening for prostate cancer  -     PSA screening; Future    Screening for thyroid disorder  -     TSH with Reflex; Future    Screening for diabetes mellitus  -     Comprehensive Metabolic Panel;  Future    Thrombocytopenia (HCC)    DVT of deep femoral vein, left (HCC)           Presents with wife for AWV  BP well controlled  Weight is stable    Follows with Dr. Breana Arce for thrombocytopenia  Reviewed recent MRI abdomen, hemangioma noted    Overall feels well  Willing to update annual labs    Denies any other problems/concerns  Follow up in six months for recheck

## 2021-11-24 RX ORDER — CHLORTHALIDONE 25 MG/1
TABLET ORAL
Qty: 90 TABLET | Refills: 0 | Status: SHIPPED | OUTPATIENT
Start: 2021-11-24 | End: 2022-01-03 | Stop reason: SDUPTHER

## 2022-01-03 DIAGNOSIS — I10 ESSENTIAL HYPERTENSION: Chronic | ICD-10-CM

## 2022-01-03 RX ORDER — LISINOPRIL 20 MG/1
40 TABLET ORAL DAILY
Qty: 180 TABLET | Refills: 3 | Status: SHIPPED | OUTPATIENT
Start: 2022-01-03

## 2022-01-03 RX ORDER — ATORVASTATIN CALCIUM 10 MG/1
TABLET, FILM COATED ORAL
Qty: 90 TABLET | Refills: 3 | Status: SHIPPED | OUTPATIENT
Start: 2022-01-03

## 2022-01-03 RX ORDER — ATENOLOL 25 MG/1
25 TABLET ORAL 2 TIMES DAILY
Qty: 180 TABLET | Refills: 3 | Status: SHIPPED | OUTPATIENT
Start: 2022-01-03

## 2022-01-03 RX ORDER — CHLORTHALIDONE 25 MG/1
TABLET ORAL
Qty: 90 TABLET | Refills: 0 | Status: SHIPPED | OUTPATIENT
Start: 2022-01-03 | End: 2022-05-17 | Stop reason: SDUPTHER

## 2022-05-12 ENCOUNTER — OFFICE VISIT (OUTPATIENT)
Dept: PRIMARY CARE CLINIC | Age: 82
End: 2022-05-12
Payer: MEDICARE

## 2022-05-12 VITALS
HEIGHT: 70 IN | DIASTOLIC BLOOD PRESSURE: 78 MMHG | RESPIRATION RATE: 12 BRPM | HEART RATE: 64 BPM | WEIGHT: 219.6 LBS | OXYGEN SATURATION: 97 % | BODY MASS INDEX: 31.44 KG/M2 | SYSTOLIC BLOOD PRESSURE: 118 MMHG

## 2022-05-12 DIAGNOSIS — I82.412 DVT OF DEEP FEMORAL VEIN, LEFT (HCC): ICD-10-CM

## 2022-05-12 DIAGNOSIS — M79.671 BILATERAL FOOT PAIN: ICD-10-CM

## 2022-05-12 DIAGNOSIS — I10 ESSENTIAL HYPERTENSION: Primary | ICD-10-CM

## 2022-05-12 DIAGNOSIS — E78.5 DYSLIPIDEMIA: ICD-10-CM

## 2022-05-12 DIAGNOSIS — N18.32 STAGE 3B CHRONIC KIDNEY DISEASE (HCC): ICD-10-CM

## 2022-05-12 DIAGNOSIS — D69.6 THROMBOCYTOPENIA (HCC): ICD-10-CM

## 2022-05-12 DIAGNOSIS — M79.672 BILATERAL FOOT PAIN: ICD-10-CM

## 2022-05-12 PROBLEM — N18.30 CHRONIC RENAL DISEASE, STAGE III (HCC): Status: ACTIVE | Noted: 2022-05-12

## 2022-05-12 PROCEDURE — 4040F PNEUMOC VAC/ADMIN/RCVD: CPT | Performed by: NURSE PRACTITIONER

## 2022-05-12 PROCEDURE — 1036F TOBACCO NON-USER: CPT | Performed by: NURSE PRACTITIONER

## 2022-05-12 PROCEDURE — G8417 CALC BMI ABV UP PARAM F/U: HCPCS | Performed by: NURSE PRACTITIONER

## 2022-05-12 PROCEDURE — G8427 DOCREV CUR MEDS BY ELIG CLIN: HCPCS | Performed by: NURSE PRACTITIONER

## 2022-05-12 PROCEDURE — 1123F ACP DISCUSS/DSCN MKR DOCD: CPT | Performed by: NURSE PRACTITIONER

## 2022-05-12 PROCEDURE — 99214 OFFICE O/P EST MOD 30 MIN: CPT | Performed by: NURSE PRACTITIONER

## 2022-05-12 ASSESSMENT — ENCOUNTER SYMPTOMS
ABDOMINAL PAIN: 0
BACK PAIN: 0
SHORTNESS OF BREATH: 0
COUGH: 0

## 2022-05-12 ASSESSMENT — PATIENT HEALTH QUESTIONNAIRE - PHQ9
SUM OF ALL RESPONSES TO PHQ9 QUESTIONS 1 & 2: 0
SUM OF ALL RESPONSES TO PHQ QUESTIONS 1-9: 0
SUM OF ALL RESPONSES TO PHQ QUESTIONS 1-9: 0
2. FEELING DOWN, DEPRESSED OR HOPELESS: 0
SUM OF ALL RESPONSES TO PHQ QUESTIONS 1-9: 0
1. LITTLE INTEREST OR PLEASURE IN DOING THINGS: 0
SUM OF ALL RESPONSES TO PHQ QUESTIONS 1-9: 0

## 2022-05-12 NOTE — PROGRESS NOTES
[facility] PRIMARY CARE  O[address]ier [address]    [address]  [address] [address]  Dept: [phone]  Dept Fax: [phone]    Minh Escamilla is a 79 y.o. male who presentstoday for his medical conditions/complaints as noted below. Minh Escamilla is c/o of  Chief Complaint   Patient presents with    6 Month Follow-Up    Hypertension    Referral - General     podiatry To [facility] in [address] [place] [address]          HPI:     Presents with wife for 6 month recheck  BP well controlled with meds  Has lost 1lb since LOV    C/o frequent stool  Stopped using probiotic as it was making him go too much  Will trial metamucil    Requesting referral to podiatry  Having a hard time cutting his toenails   Bilateral foot pain    Denies any other problems/concerns      No results found for: LABA1C          ( goal A1C is < 7)   No results found for: LABMICR  LDL Cholesterol (mg/dL)   Date Value   2021 72   2020 81   2019 88     LDL Calculated (mg/dL)   Date Value   2013 86       (goal LDL is <100)   AST (U/L)   Date Value   2021 19     ALT (U/L)   Date Value   2021 19     BUN (mg/dL)   Date Value   2021 23     BP Readings from Last 3 Encounters:   22 118/78   21 116/84   21 126/70          ([id number])    Past Medical History:   Diagnosis Date    Cancer ([address]     basal cell    Diverticulosis     coli    Plantar fasciitis       Past Surgical History:   Procedure Laterality Date    CATARACT REMOVAL WITH IMPLANT Bilateral may 2013    COLONOSCOPY      HERNIA REPAIR      SKIN CANCER EXCISION      multi sites   [place]         History reviewed. No pertinent family history.        Social History     Tobacco Use    Smoking status: Former Smoker     Packs/day: 1.50     Years: 22.00     Pack years: 33.00     Types: Cigarettes     Quit date: 1981     Years since quittin.3    Smokeless tobacco: Never Used   Substance Use Topics    Alcohol use: Yes     Comment: 1 drink daily      Current Outpatient Medications   Medication Sig Dispense Refill    atenolol (TENORMIN) 25 MG tablet Take 1 tablet by mouth 2 times daily 180 tablet 3    atorvastatin (LIPITOR) 10 MG tablet TAKE 1 TABLET BY MOUTH ONCE DAILY 90 tablet 3    chlorthalidone (HYGROTON) 25 MG tablet Take 1 tablet by mouth once daily 90 tablet 0    lisinopril (PRINIVIL;ZESTRIL) 20 MG tablet Take 2 tablets by mouth daily 180 tablet 3    Multiple Vitamins-Minerals (THERAPEUTIC MULTIVITAMIN-MINERALS) tablet Take 1 tablet by mouth daily. No current facility-administered medications for this visit. Allergies   Allergen Reactions    Sulfa Antibiotics        Health Maintenance   Topic Date Due    Flu vaccine (Season Ended) 11/11/2022 (Originally 9/1/2022)    Lipids  11/11/2022    Depression Screen  11/11/2022    Annual Wellness Visit (AWV)  11/12/2022    DTaP/Tdap/Td vaccine (2 - Td or Tdap) 11/15/2026    Shingles vaccine  Completed    Pneumococcal 65+ years Vaccine  Completed    COVID-19 Vaccine  Completed    Hepatitis A vaccine  Aged Out    Hepatitis B vaccine  Aged Out    Hib vaccine  Aged Out    Meningococcal (ACWY) vaccine  Aged Out       Subjective:      Review of Systems   Constitutional: Negative for chills, fatigue and fever. HENT: Negative for congestion. Eyes: Negative for visual disturbance. Respiratory: Negative for cough and shortness of breath. Cardiovascular: Negative for chest pain and palpitations. Gastrointestinal: Negative for abdominal pain. Genitourinary: Negative for difficulty urinating and dysuria. Musculoskeletal: Negative for arthralgias and back pain. Neurological: Negative for dizziness and headaches. Psychiatric/Behavioral: Negative for self-injury, sleep disturbance and suicidal ideas. The patient is not nervous/anxious. Objective:     Physical Exam  Vitals and nursing note reviewed.    Constitutional:       Appearance: He is well-developed. HENT:      Head: Normocephalic and atraumatic. Eyes:      Pupils: Pupils are equal, round, and reactive to light. Cardiovascular:      Rate and Rhythm: Normal rate and regular rhythm. Heart sounds: Normal heart sounds. Pulmonary:      Effort: Pulmonary effort is normal.      Breath sounds: Normal breath sounds. Abdominal:      General: Bowel sounds are normal.      Palpations: Abdomen is soft. Tenderness: There is no abdominal tenderness. Musculoskeletal:         General: Normal range of motion. Cervical back: Normal range of motion. Skin:     General: Skin is warm and dry. Neurological:      Mental Status: He is alert and oriented to person, place, and time. Psychiatric:         Behavior: Behavior normal.         Thought Content: Thought content normal.         Judgment: Judgment normal.       /78   Pulse 64   Resp 12   Ht 5' 10\" (1.778 m)   Wt 219 lb 9.6 oz (99.6 kg)   SpO2 97%   BMI 31.51 kg/m²     Assessment:       Diagnosis Orders   1. Essential hypertension     2. DVT of deep femoral vein, left (HCC)     3. Thrombocytopenia ([address]     4. Stage 3b chronic kidney disease ([address]     5. Dyslipidemia     6. Bilateral foot pain  External Referral To Podiatry             Plan:      Return in about 6 months (around 11/12/2022) for AWV. 1. Chronic conditions- Stable. Continue current meds. Continue diet/exercise. Follow up in six months for recheck. Patient given educational materials - see patient instructions. Discussed use, benefit, and side effects of prescribed medications. All patientquestions answered. Pt voiced understanding. Reviewed health maintenance. Instructedto continue current medications, diet and exercise. Patient agreed with treatmentplan. Follow up as directed.      Electronicallysigned by BOBBY Webber CNP on 5/12/2022 at 1:26 PM

## 2022-05-17 RX ORDER — CHLORTHALIDONE 25 MG/1
TABLET ORAL
Qty: 90 TABLET | Refills: 0 | Status: SHIPPED | OUTPATIENT
Start: 2022-05-17 | End: 2022-08-17

## 2022-08-17 RX ORDER — CHLORTHALIDONE 25 MG/1
TABLET ORAL
Qty: 90 TABLET | Refills: 0 | Status: SHIPPED | OUTPATIENT
Start: 2022-08-17

## 2022-11-14 ENCOUNTER — OFFICE VISIT (OUTPATIENT)
Dept: PRIMARY CARE CLINIC | Age: 82
End: 2022-11-14
Payer: MEDICARE

## 2022-11-14 VITALS
RESPIRATION RATE: 12 BRPM | DIASTOLIC BLOOD PRESSURE: 78 MMHG | HEART RATE: 54 BPM | HEIGHT: 71 IN | SYSTOLIC BLOOD PRESSURE: 116 MMHG | WEIGHT: 214.4 LBS | OXYGEN SATURATION: 96 % | BODY MASS INDEX: 30.02 KG/M2

## 2022-11-14 DIAGNOSIS — E78.5 HYPERLIPIDEMIA, UNSPECIFIED HYPERLIPIDEMIA TYPE: ICD-10-CM

## 2022-11-14 DIAGNOSIS — Z00.00 ENCOUNTER FOR GENERAL ADULT MEDICAL EXAMINATION W/O ABNORMAL FINDINGS: Primary | ICD-10-CM

## 2022-11-14 DIAGNOSIS — R73.09 ELEVATED GLUCOSE: ICD-10-CM

## 2022-11-14 DIAGNOSIS — N18.32 STAGE 3B CHRONIC KIDNEY DISEASE (HCC): ICD-10-CM

## 2022-11-14 DIAGNOSIS — D69.6 THROMBOCYTOPENIA (HCC): ICD-10-CM

## 2022-11-14 DIAGNOSIS — I82.412 DVT OF DEEP FEMORAL VEIN, LEFT (HCC): ICD-10-CM

## 2022-11-14 DIAGNOSIS — R53.83 OTHER FATIGUE: ICD-10-CM

## 2022-11-14 PROCEDURE — G8484 FLU IMMUNIZE NO ADMIN: HCPCS | Performed by: NURSE PRACTITIONER

## 2022-11-14 PROCEDURE — 3074F SYST BP LT 130 MM HG: CPT | Performed by: NURSE PRACTITIONER

## 2022-11-14 PROCEDURE — 3078F DIAST BP <80 MM HG: CPT | Performed by: NURSE PRACTITIONER

## 2022-11-14 PROCEDURE — G0439 PPPS, SUBSEQ VISIT: HCPCS | Performed by: NURSE PRACTITIONER

## 2022-11-14 PROCEDURE — 1123F ACP DISCUSS/DSCN MKR DOCD: CPT | Performed by: NURSE PRACTITIONER

## 2022-11-14 SDOH — ECONOMIC STABILITY: FOOD INSECURITY: WITHIN THE PAST 12 MONTHS, THE FOOD YOU BOUGHT JUST DIDN'T LAST AND YOU DIDN'T HAVE MONEY TO GET MORE.: NEVER TRUE

## 2022-11-14 SDOH — ECONOMIC STABILITY: FOOD INSECURITY: WITHIN THE PAST 12 MONTHS, YOU WORRIED THAT YOUR FOOD WOULD RUN OUT BEFORE YOU GOT MONEY TO BUY MORE.: NEVER TRUE

## 2022-11-14 ASSESSMENT — PATIENT HEALTH QUESTIONNAIRE - PHQ9
2. FEELING DOWN, DEPRESSED OR HOPELESS: 0
SUM OF ALL RESPONSES TO PHQ QUESTIONS 1-9: 0
SUM OF ALL RESPONSES TO PHQ9 QUESTIONS 1 & 2: 0
SUM OF ALL RESPONSES TO PHQ QUESTIONS 1-9: 0
1. LITTLE INTEREST OR PLEASURE IN DOING THINGS: 0

## 2022-11-14 ASSESSMENT — LIFESTYLE VARIABLES
HOW OFTEN DO YOU HAVE A DRINK CONTAINING ALCOHOL: NEVER
HOW MANY STANDARD DRINKS CONTAINING ALCOHOL DO YOU HAVE ON A TYPICAL DAY: PATIENT DOES NOT DRINK

## 2022-11-14 ASSESSMENT — SOCIAL DETERMINANTS OF HEALTH (SDOH): HOW HARD IS IT FOR YOU TO PAY FOR THE VERY BASICS LIKE FOOD, HOUSING, MEDICAL CARE, AND HEATING?: NOT HARD AT ALL

## 2022-11-14 NOTE — PROGRESS NOTES
Medicare Annual Wellness Visit    Rosa Ledesma is here for Medicare AWV    Assessment & Plan   Elevated glucose  -     Comprehensive Metabolic Panel; Future  Other fatigue  -     CBC; Future  -     TSH with Reflex; Future  Hyperlipidemia, unspecified hyperlipidemia type  -     Lipid Panel; Future    Recommendations for Preventive Services Due: see orders and patient instructions/AVS.  Recommended screening schedule for the next 5-10 years is provided to the patient in written form: see Patient Instructions/AVS.     Return in about 6 months (around 5/14/2023) for recheck. Subjective   The following acute and/or chronic problems were also addressed today:  No acute concerns    Patient's complete Health Risk Assessment and screening values have been reviewed and are found in Flowsheets. The following problems were reviewed today and where indicated follow up appointments were made and/or referrals ordered.     Positive Risk Factor Screenings with Interventions:             General Health and ACP:  General  In general, how would you say your health is?: Excellent  In the past 7 days, have you experienced any of the following: New or Increased Pain, New or Increased Fatigue, Loneliness, Social Isolation, Stress or Anger?: No  Do you get the social and emotional support that you need?: Yes  Do you have a Living Will?: Yes    Advance Directives       Power of  Living Will ACP-Advance Directive ACP-Power of     Not on File Not on File Not on File Not on File        General Health Risk Interventions:  No issues identified    Health Habits/Nutrition:  Physical Activity: Inactive    Days of Exercise per Week: 0 days    Minutes of Exercise per Session: 0 min     Have you lost any weight without trying in the past 3 months?: No  Body mass index: (!) 30.33  Have you seen the dentist within the past year?: Yes  Health Habits/Nutrition Interventions:  No issues identified             Objective   Vitals: 11/14/22 1334   BP: 116/78   Pulse: 54   Resp: 12   SpO2: 96%   Weight: 214 lb 6.4 oz (97.3 kg)   Height: 5' 10.5\" (1.791 m)      Body mass index is 30.33 kg/m². General Appearance: alert and oriented to person, place and time, well developed and well- nourished, in no acute distress  Skin: warm and dry, no rash or erythema  Head: normocephalic and atraumatic  Eyes: pupils equal, round, and reactive to light, extraocular eye movements intact, conjunctivae normal  ENT: tympanic membrane, external ear and ear canal normal bilaterally, nose without deformity, nasal mucosa and turbinates normal without polyps  Neck: supple and non-tender without mass, no thyromegaly or thyroid nodules, no cervical lymphadenopathy  Pulmonary/Chest: clear to auscultation bilaterally- no wheezes, rales or rhonchi, normal air movement, no respiratory distress  Cardiovascular: normal rate, regular rhythm, normal S1 and S2, no murmurs, rubs, clicks, or gallops, distal pulses intact, no carotid bruits  Abdomen: soft, non-tender, non-distended, normal bowel sounds, no masses or organomegaly  Extremities: no cyanosis, clubbing or edema  Musculoskeletal: normal range of motion, no joint swelling, deformity or tenderness  Neurologic: reflexes normal and symmetric, no cranial nerve deficit, gait, coordination and speech normal       Allergies   Allergen Reactions    Sulfa Antibiotics      Prior to Visit Medications    Medication Sig Taking?  Authorizing Provider   chlorthalidone (HYGROTON) 25 MG tablet TAKE ONE TABLET BY MOUTH ONE TIME DAILY Yes BOBBY Schneider CNP   atenolol (TENORMIN) 25 MG tablet Take 1 tablet by mouth 2 times daily Yes BOBBY Schneider CNP   atorvastatin (LIPITOR) 10 MG tablet TAKE 1 TABLET BY MOUTH ONCE DAILY Yes BOBBY Schneider CNP   lisinopril (PRINIVIL;ZESTRIL) 20 MG tablet Take 2 tablets by mouth daily Yes BOBBY Schneider CNP   Multiple Vitamins-Minerals (THERAPEUTIC MULTIVITAMIN-MINERALS) tablet Take 1 tablet by mouth daily.  Yes Historical Provider, MD Keller (Including outside providers/suppliers regularly involved in providing care):   Patient Care Team:  BOBBY Aguirre CNP as PCP - General (Nurse Practitioner)  BOBBY Aguirre CNP as PCP - [facility] Empaneled Provider     Reviewed and updated this visit:  Tobacco  Allergies  Meds  Problems  Med Hx  Surg Hx  Soc Hx  Fam Hx                 Presents with wife for AWV  BP well controlled  Has lost 5lb since LOV  States his appetite has been good    No problems/concerns today  Had labs completed with Dr. Oh Teran one month ago at [facility]    Follow up in six months for recheck/earlier if needed

## 2022-11-21 RX ORDER — CHLORTHALIDONE 25 MG/1
TABLET ORAL
Qty: 90 TABLET | Refills: 0 | Status: SHIPPED | OUTPATIENT
Start: 2022-11-21

## 2022-12-12 ENCOUNTER — TELEPHONE (OUTPATIENT)
Dept: PRIMARY CARE CLINIC | Age: 82
End: 2022-12-12

## 2022-12-12 ENCOUNTER — SCHEDULED TELEPHONE ENCOUNTER (OUTPATIENT)
Dept: PRIMARY CARE CLINIC | Age: 82
End: 2022-12-12
Payer: MEDICARE

## 2022-12-12 DIAGNOSIS — U07.1 COVID: ICD-10-CM

## 2022-12-12 PROCEDURE — 98967 PH1 ASSMT&MGMT NQHP 11-20: CPT | Performed by: NURSE PRACTITIONER

## 2022-12-12 RX ORDER — PREDNISONE 50 MG/1
50 TABLET ORAL DAILY
Qty: 5 TABLET | Refills: 0 | Status: SHIPPED | OUTPATIENT
Start: 2022-12-12 | End: 2022-12-17

## 2022-12-12 RX ORDER — GUAIFENESIN AND CODEINE PHOSPHATE 100; 10 MG/5ML; MG/5ML
5 SOLUTION ORAL EVERY 4 HOURS PRN
Qty: 118 ML | Refills: 0 | Status: SHIPPED | OUTPATIENT
Start: 2022-12-12 | End: 2022-12-19

## 2022-12-12 NOTE — TELEPHONE ENCOUNTER
Patient called, has had cold symptoms for 2-3 weeks now, denies fever or body aches, just dry cough. Tested positive for covid at home yesterday but states he doesn't feel bad just a cough that is keeping him up at night. Has to sleep sitting up, was taking Sudafed but \"binds him up\" so has not taken for a week now. Can he get something for the cough.

## 2022-12-12 NOTE — PROGRESS NOTES
Presents via telephone for acute concern for covid    States he has had symptoms for longer than one week  Most bothersome issue is a cough and having to sleep sitting up  No changes over the last 5 days  Reviewed that he would be out of window for antiviral med  Encouraged rest/fluids  Rx given for prednisone and cheratussin.  Follow up in office if no improvement    This visit occurred via telephone, he consented prior  He was at his home and I was in the office  The visit lasted 11 min

## 2022-12-22 ENCOUNTER — OFFICE VISIT (OUTPATIENT)
Dept: PRIMARY CARE CLINIC | Age: 82
End: 2022-12-22
Payer: MEDICARE

## 2022-12-22 VITALS
BODY MASS INDEX: 29.66 KG/M2 | SYSTOLIC BLOOD PRESSURE: 112 MMHG | RESPIRATION RATE: 12 BRPM | OXYGEN SATURATION: 97 % | HEART RATE: 73 BPM | HEIGHT: 70 IN | WEIGHT: 207.2 LBS | DIASTOLIC BLOOD PRESSURE: 84 MMHG

## 2022-12-22 DIAGNOSIS — L03.90 CELLULITIS, UNSPECIFIED CELLULITIS SITE: Primary | ICD-10-CM

## 2022-12-22 PROCEDURE — G8427 DOCREV CUR MEDS BY ELIG CLIN: HCPCS | Performed by: NURSE PRACTITIONER

## 2022-12-22 PROCEDURE — 1036F TOBACCO NON-USER: CPT | Performed by: NURSE PRACTITIONER

## 2022-12-22 PROCEDURE — 3074F SYST BP LT 130 MM HG: CPT | Performed by: NURSE PRACTITIONER

## 2022-12-22 PROCEDURE — 1123F ACP DISCUSS/DSCN MKR DOCD: CPT | Performed by: NURSE PRACTITIONER

## 2022-12-22 PROCEDURE — 99214 OFFICE O/P EST MOD 30 MIN: CPT | Performed by: NURSE PRACTITIONER

## 2022-12-22 PROCEDURE — G8417 CALC BMI ABV UP PARAM F/U: HCPCS | Performed by: NURSE PRACTITIONER

## 2022-12-22 PROCEDURE — 3078F DIAST BP <80 MM HG: CPT | Performed by: NURSE PRACTITIONER

## 2022-12-22 PROCEDURE — G8484 FLU IMMUNIZE NO ADMIN: HCPCS | Performed by: NURSE PRACTITIONER

## 2022-12-22 RX ORDER — DOXYCYCLINE HYCLATE 100 MG
100 TABLET ORAL 2 TIMES DAILY
Qty: 20 TABLET | Refills: 0 | Status: SHIPPED | OUTPATIENT
Start: 2022-12-22 | End: 2023-01-01

## 2022-12-22 RX ORDER — CEPHALEXIN 500 MG/1
500 CAPSULE ORAL 4 TIMES DAILY
Qty: 28 CAPSULE | Refills: 0 | Status: SHIPPED | OUTPATIENT
Start: 2022-12-22 | End: 2022-12-29

## 2022-12-22 ASSESSMENT — PATIENT HEALTH QUESTIONNAIRE - PHQ9
1. LITTLE INTEREST OR PLEASURE IN DOING THINGS: 0
SUM OF ALL RESPONSES TO PHQ QUESTIONS 1-9: 0
SUM OF ALL RESPONSES TO PHQ9 QUESTIONS 1 & 2: 0
SUM OF ALL RESPONSES TO PHQ QUESTIONS 1-9: 0
SUM OF ALL RESPONSES TO PHQ QUESTIONS 1-9: 0
2. FEELING DOWN, DEPRESSED OR HOPELESS: 0
SUM OF ALL RESPONSES TO PHQ QUESTIONS 1-9: 0

## 2022-12-22 ASSESSMENT — ENCOUNTER SYMPTOMS
ABDOMINAL PAIN: 0
SHORTNESS OF BREATH: 0
BACK PAIN: 0
COUGH: 0

## 2022-12-22 NOTE — PROGRESS NOTES
[address] PRIMARY CARE  Olivier Cicha [address]    [address]  [address] [address]  Dept: [phone]  Dept Fax: [phone]    Lexx Marks is a 79 y.o. male who presentstoday for his medical conditions/complaints as noted below. Lexx Gaytanite is c/o of  Chief Complaint   Patient presents with    Swelling     Blisters on right foot     Rash     Arms and legs          HPI:     Presents with wife and son for acute concerns  BP well controlled  Weight is stable    Dx with covid, given steroid and cough med 22  Broke into rash to bilateral upper and lower extremities  Scratching rash and blister formed to right foot x 1 week ago  Has redness climbing to mid shin  See photo under media  Otherwise he is feeling well  Denies any fever  Eating and drinking ok    Denies any other problems/concerns      No results found for: LABA1C          ( goal A1C is < 7)   No results found for: LABMICR  LDL Cholesterol (mg/dL)   Date Value   2021 72   2020 81   2019 88     LDL Calculated (mg/dL)   Date Value   2013 86       (goal LDL is <100)   AST (U/L)   Date Value   2021 19     ALT (U/L)   Date Value   2021 19     BUN (mg/dL)   Date Value   2021 23     BP Readings from Last 3 Encounters:   22 112/84   22 116/78   22 118/78          ([id number])    Past Medical History:   Diagnosis Date    Cancer ([facility]     basal cell    Diverticulosis     coli    Plantar fasciitis       Past Surgical History:   Procedure Laterality Date    CATARACT REMOVAL WITH IMPLANT Bilateral may 2013    COLONOSCOPY      HERNIA REPAIR      SKIN CANCER EXCISION      multi sites    VEIN SURGERY         History reviewed. No pertinent family history.        Social History     Tobacco Use    Smoking status: Former     Packs/day: 1.50     Years: 22.00     Pack years: 33.00     Types: Cigarettes     Quit date: 1981     Years since quittin.0    Smokeless tobacco: Never   Substance Use Topics    Alcohol use: Yes     Comment: 1 drink daily      Current Outpatient Medications   Medication Sig Dispense Refill    doxycycline hyclate (VIBRA-TABS) 100 MG tablet Take 1 tablet by mouth 2 times daily for 10 days 20 tablet 0    cephALEXin (KEFLEX) 500 MG capsule Take 1 capsule by mouth 4 times daily for 7 days 28 capsule 0    chlorthalidone (HYGROTON) 25 MG tablet TAKE ONE TABLET BY MOUTH ONE TIME DAILY 90 tablet 0    atenolol (TENORMIN) 25 MG tablet Take 1 tablet by mouth 2 times daily 180 tablet 3    atorvastatin (LIPITOR) 10 MG tablet TAKE 1 TABLET BY MOUTH ONCE DAILY 90 tablet 3    lisinopril (PRINIVIL;ZESTRIL) 20 MG tablet Take 2 tablets by mouth daily 180 tablet 3    Multiple Vitamins-Minerals (THERAPEUTIC MULTIVITAMIN-MINERALS) tablet Take 1 tablet by mouth daily. No current facility-administered medications for this visit. Allergies   Allergen Reactions    Sulfa Antibiotics        Health Maintenance   Topic Date Due    Lipids  11/11/2022    Depression Screen  11/14/2023    DTaP/Tdap/Td vaccine (2 - Td or Tdap) 11/15/2026    Flu vaccine  Completed    Shingles vaccine  Completed    Pneumococcal 65+ years Vaccine  Completed    COVID-19 Vaccine  Completed    Hepatitis A vaccine  Aged Out    Hib vaccine  Aged Out    Meningococcal (ACWY) vaccine  Aged Out       Subjective:      Review of Systems   Constitutional:  Negative for chills and fever. HENT:  Negative for congestion. Eyes:  Negative for visual disturbance. Respiratory:  Negative for cough and shortness of breath. Cardiovascular:  Negative for chest pain and palpitations. Gastrointestinal:  Negative for abdominal pain. Genitourinary:  Negative for difficulty urinating and dysuria. Musculoskeletal:  Negative for arthralgias and back pain. Skin:  Positive for wound. Neurological:  Negative for dizziness and headaches.    Psychiatric/Behavioral:  Negative for self-injury, sleep disturbance and suicidal ideas. The patient is not nervous/anxious. Objective:     Physical Exam  Vitals and nursing note reviewed. Constitutional:       Appearance: He is well-developed. HENT:      Head: Normocephalic and atraumatic. Eyes:      Pupils: Pupils are equal, round, and reactive to light. Cardiovascular:      Rate and Rhythm: Normal rate and regular rhythm. Heart sounds: Normal heart sounds. Pulmonary:      Effort: Pulmonary effort is normal.      Breath sounds: Normal breath sounds. Abdominal:      General: Bowel sounds are normal.      Palpations: Abdomen is soft. Tenderness: There is no abdominal tenderness. Musculoskeletal:         General: Normal range of motion. Cervical back: Normal range of motion. Skin:     General: Skin is warm and dry. Neurological:      Mental Status: He is alert and oriented to person, place, and time. Psychiatric:         Behavior: Behavior normal.         Thought Content: Thought content normal.         Judgment: Judgment normal.     /84   Pulse 73   Resp 12   Ht 5' 10.2\" (1.783 m)   Wt 207 lb 3.2 oz (94 kg)   SpO2 97%   BMI 29.56 kg/m²     Assessment:       Diagnosis Orders   1. Cellulitis, unspecified cellulitis site                  Plan:      Return if symptoms worsen or fail to improve. Cellulitis- Rx given for doxy and keflex, marked redness. Instructed to seek emergent care if redness extends past line, or for any fevers/chills/malaise. He and son state an understanding       Orders Placed This Encounter   Medications    doxycycline hyclate (VIBRA-TABS) 100 MG tablet     Sig: Take 1 tablet by mouth 2 times daily for 10 days     Dispense:  20 tablet     Refill:  0    cephALEXin (KEFLEX) 500 MG capsule     Sig: Take 1 capsule by mouth 4 times daily for 7 days     Dispense:  28 capsule     Refill:  0       Patient given educational materials - see patient instructions. Discussed use, benefit, and side effects of prescribed medications. All patientquestions answered. Pt voiced understanding. Reviewed health maintenance. Instructedto continue current medications, diet and exercise. Patient agreed with treatmentplan. Follow up as directed.      Electronicallysigned by Earnesteen Nageotte, APRN - CNP on 12/22/2022 at 12:36 PM

## 2022-12-27 ENCOUNTER — TELEPHONE (OUTPATIENT)
Dept: PRIMARY CARE CLINIC | Age: 82
End: 2022-12-27

## 2022-12-27 ENCOUNTER — HOSPITAL ENCOUNTER (OUTPATIENT)
Age: 82
Setting detail: SPECIMEN
Discharge: HOME | End: 2022-12-27

## 2022-12-27 ENCOUNTER — OFFICE VISIT (OUTPATIENT)
Dept: FAMILY MEDICINE CLINIC | Age: 82
End: 2022-12-27
Payer: MEDICARE

## 2022-12-27 ENCOUNTER — TELEPHONE (OUTPATIENT)
Dept: PODIATRY | Age: 82
End: 2022-12-27

## 2022-12-27 VITALS
RESPIRATION RATE: 18 BRPM | OXYGEN SATURATION: 98 % | BODY MASS INDEX: 28.98 KG/M2 | WEIGHT: 207 LBS | DIASTOLIC BLOOD PRESSURE: 84 MMHG | SYSTOLIC BLOOD PRESSURE: 122 MMHG | TEMPERATURE: 98.6 F | HEART RATE: 86 BPM | HEIGHT: 71 IN

## 2022-12-27 DIAGNOSIS — Z51.89 VISIT FOR WOUND CHECK: ICD-10-CM

## 2022-12-27 DIAGNOSIS — T14.8XXA INFECTED BLISTER: ICD-10-CM

## 2022-12-27 DIAGNOSIS — L08.9 INFECTED BLISTER: ICD-10-CM

## 2022-12-27 DIAGNOSIS — L03.115 CELLULITIS OF RIGHT LOWER EXTREMITY: ICD-10-CM

## 2022-12-27 DIAGNOSIS — L08.9 INFECTED BLISTER: Primary | ICD-10-CM

## 2022-12-27 DIAGNOSIS — T14.8XXA INFECTED BLISTER: Primary | ICD-10-CM

## 2022-12-27 PROCEDURE — G8484 FLU IMMUNIZE NO ADMIN: HCPCS | Performed by: NURSE PRACTITIONER

## 2022-12-27 PROCEDURE — G8417 CALC BMI ABV UP PARAM F/U: HCPCS | Performed by: NURSE PRACTITIONER

## 2022-12-27 PROCEDURE — 1036F TOBACCO NON-USER: CPT | Performed by: NURSE PRACTITIONER

## 2022-12-27 PROCEDURE — 99214 OFFICE O/P EST MOD 30 MIN: CPT | Performed by: NURSE PRACTITIONER

## 2022-12-27 PROCEDURE — 1123F ACP DISCUSS/DSCN MKR DOCD: CPT | Performed by: NURSE PRACTITIONER

## 2022-12-27 PROCEDURE — 3078F DIAST BP <80 MM HG: CPT | Performed by: NURSE PRACTITIONER

## 2022-12-27 PROCEDURE — 3074F SYST BP LT 130 MM HG: CPT | Performed by: NURSE PRACTITIONER

## 2022-12-27 PROCEDURE — G8427 DOCREV CUR MEDS BY ELIG CLIN: HCPCS | Performed by: NURSE PRACTITIONER

## 2022-12-27 ASSESSMENT — ENCOUNTER SYMPTOMS
VOMITING: 0
RHINORRHEA: 0
BACK PAIN: 0
EYE REDNESS: 0
NAUSEA: 0
SHORTNESS OF BREATH: 0
COLOR CHANGE: 1
COUGH: 0

## 2022-12-27 NOTE — TELEPHONE ENCOUNTER
Lavern,  I have a call into wound care and also into Dr. Avery Rico who does wound care. Once I know who I can get him in with soon I will let you know. Not sure why they would send this to you when I have not even completed his care from today.

## 2022-12-27 NOTE — TELEPHONE ENCOUNTER
Patient was seen at walk-in today for a wound check and dressing change. Patient is asking if he would qualify for a home health nurse to come out and do the wound check and dressing change. He states that with the wound being on his right foot he can not drive and that his wife does not drive at all. Patient states that his son is not always available to bring him to the office.

## 2022-12-27 NOTE — DISCHARGE INSTRUCTIONS
[facility]      Visit  Discharge Instructions / Physician Orders  DATE: 12/28/2022     Home Care: none     SUPPLIES ORDERED THRU:                     DATE LAST SUPPLIED     Wound Location:  right dorsal foot     Cleanse with: Liquid antibacterial soap and water, rinse well      Dressing Orders:  Primary dressing   amrit                    Secondary dressing     gentac                      secure with     ace wrap toes to knee      x 30days     Frequency:  once daily     Additional Orders: Increase protein to diet (meat, cheese, eggs, fish, peanut butter, nuts and beans)  Multivitamin daily    OFFLOADING [] YES  TYPE:                  [x] NA    Weekly wound care visits until determined otherwise. Antibiotic therapy-wound care related YES [x] NO [] NA[]    MY CHART []     Smart Device  []     HYPERBARIC TREATMENT-                TREATMENT #                          Your next appointment with the [address] [place] [address] is in 1 week                                                                                                   (Please note your next appointment above and if you are unable to keep, kindly give a 24 hour notice. Thank you.)  If more than 15 min late we cannot guarantee you will be seen due to clinician schedule  Per Policy, Excessive cancellation will call for dismissal from program.  If you experience any of the following, please call the [address] [place] [address] during business hours:  [phone]     * Increase in Pain  * Temperature over 101  * Increase in drainage from your wound  * Drainage with a foul odor  * Bleeding  * Increase in swelling  * Need for compression bandage changes due to slippage, breakthrough drainage. If you need medical attention outside of the business hours of the [address] [place] [address] please contact your PCP or go to the nearest emergency room.      The information contained in the After Visit Summary has been reviewed with me, the patient and/or responsible adult, by my health care provider(s). I had the opportunity to ask questions regarding this information.  I have elected to receive;      []After Visit Summary  [x]Comprehensive Discharge Instruction      Patient signature______________________________________Date:________  Electronically signed by Francisco Regan RN on 12/28/2022 at 9:39 AM   Electronically signed by BOBBY Espinoza - CNP on 12/28/2022 at 9:43 AM

## 2022-12-27 NOTE — PROGRESS NOTES
"Jonnathan calls in today regarding his long term paper work and his continued back pain. States that after he went off the week of Ibuprofen the pain is back again. The ibuprofen helped but it is still there at the site of the \"original cancer\" he is asking for imaging. Feels like something is pinching.   Also talked about his neuropathy. He has never been on gabapentin but has some. Got it from a doctor before his diagnosis of cancer due to his hx of neuropathy. He does not want to take this though because of the side effects.      Talked with Doctor Adriana. Jonnathan will go back on the Ibuprofen through the weekend and he will address the above on Monday. If anything acute over the holiday weekend he will call the 24 hour resource number.     Jonnathan expressed good understanding of the above.    " [facility]  [address] [facility]  [address] [address]  Dept: [phone]  Dept Fax: [phone]    Susan Baca is a 79 y.o. male who presents today for his medical conditions/complaints of   Chief Complaint   Patient presents with    Wound Check     Change bandage on right foot          HPI:     /84   Pulse 86   Temp 98.6 °F (37 °C)   Resp 18   Ht 5' 10.5\" (1.791 m)   Wt 207 lb (93.9 kg)   SpO2 98%   BMI 29.28 kg/m²       HPI  Pt presents for wound recheck and to have bandage changed to his right foot. He was seen on 2022 by his PCP for cellulitis and started on Doxy and Keflex which he has been taking. Pt states he had COVID 2 weeks ago. At that time he noticed a blister on the top of his right foot. He also had rash on his arms and legs bilaterally. The blister ruptured and the skin became partially intact. He has redness, warmth and swelling to the foot. The redness does extend up into his right lower leg and is painful. There is no fever, chills, nausea, bleeding or drainage. No numbness or tingling. Sensation is intact and skin is warm to the touch. Duplex scan of bilateral lower extremities on 2018- No DVT. 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 TRIB- reflux noted medial distal calf saphaenous tributary. Past Medical History:   Diagnosis Date    Cancer ([address]     basal cell    Diverticulosis     coli    Plantar fasciitis         Past Surgical History:   Procedure Laterality Date    CATARACT REMOVAL WITH IMPLANT Bilateral may 2013    COLONOSCOPY      HERNIA REPAIR      SKIN CANCER EXCISION      multi sites    VEIN SURGERY         No family history on file.     Social History     Tobacco Use    Smoking status: Former     Packs/day: 1.50     Years: 22.00     Pack years: 33.00 Types: Cigarettes     Quit date: 1981     Years since quittin.0    Smokeless tobacco: Never   Substance Use Topics    Alcohol use: Yes     Comment: 1 drink daily        Prior to Visit Medications    Medication Sig Taking? Authorizing Provider   doxycycline hyclate (VIBRA-TABS) 100 MG tablet Take 1 tablet by mouth 2 times daily for 10 days Yes Parisa Primus, APRN - CNP   cephALEXin (KEFLEX) 500 MG capsule Take 1 capsule by mouth 4 times daily for 7 days Yes Parisa Primus, APRN - CNP   chlorthalidone (HYGROTON) 25 MG tablet TAKE ONE TABLET BY MOUTH ONE TIME DAILY Yes Parisa Primus, APRN - CNP   atenolol (TENORMIN) 25 MG tablet Take 1 tablet by mouth 2 times daily Yes Parisa Primus, APRN - CNP   atorvastatin (LIPITOR) 10 MG tablet TAKE 1 TABLET BY MOUTH ONCE DAILY Yes Parisa Primus, APRN - CNP   lisinopril (PRINIVIL;ZESTRIL) 20 MG tablet Take 2 tablets by mouth daily Yes Parisa Primus, APRN - CNP   Multiple Vitamins-Minerals (THERAPEUTIC MULTIVITAMIN-MINERALS) tablet Take 1 tablet by mouth daily. Yes Historical Provider, MD       Allergies   Allergen Reactions    Sulfa Antibiotics          Subjective:      Review of Systems   Constitutional:  Negative for chills and fever. HENT:  Negative for congestion and rhinorrhea. Eyes:  Negative for redness and visual disturbance. Respiratory:  Negative for cough and shortness of breath. Cardiovascular:  Positive for leg swelling. Gastrointestinal:  Negative for nausea and vomiting. Genitourinary:  Negative for decreased urine volume and difficulty urinating. Musculoskeletal:  Positive for gait problem (due to right foot wound). Negative for back pain and myalgias. Skin:  Positive for color change and wound. Neurological:  Negative for weakness, light-headedness and headaches. Psychiatric/Behavioral:  Negative for sleep disturbance. Objective:     Physical Exam  Vitals and nursing note reviewed.    Constitutional: General: He is not in acute distress. Appearance: Normal appearance. HENT:      Head: Normocephalic and atraumatic. Right Ear: Tympanic membrane and ear canal normal.      Left Ear: Tympanic membrane and ear canal normal.      Nose: Nose normal.      Mouth/Throat:      Lips: Pink. Mouth: Mucous membranes are moist.      Pharynx: Oropharynx is clear. Uvula midline. Eyes:      Extraocular Movements: Extraocular movements intact. Conjunctiva/sclera: Conjunctivae normal.   Cardiovascular:      Rate and Rhythm: Normal rate and regular rhythm. Pulses: Normal pulses. Pulmonary:      Effort: Pulmonary effort is normal.      Breath sounds: Normal breath sounds. Abdominal:      General: Bowel sounds are normal.      Palpations: Abdomen is soft. Musculoskeletal:         General: Normal range of motion. Cervical back: Normal range of motion and neck supple. Right lower leg: Edema present. Skin:     General: Skin is warm and dry. Capillary Refill: Capillary refill takes less than 2 seconds. Comments: Right foot wound with cellulitis- see images uploaded into media file. Neurological:      Mental Status: He is alert and oriented to person, place, and time. Psychiatric:         Mood and Affect: Mood normal.         Thought Content: Thought content normal.         MEDICAL DECISION MAKING Assessment/Plan:     Thierno Nichole was seen today for wound check.     Diagnoses and all orders for this visit:    Infected blister  -     Culture, Wound Aerobic Only; Future  -     [address] ace wrap    Cellulitis of right lower extremity  -     Culture, Wound Aerobic Only; Future  -     [address] ace wrap    Visit for wound check  -     [address] ace wrap      Results for orders placed or performed during the hospital encounter of 11/11/21 Comprehensive Metabolic Panel   Result Value Ref Range    Glucose 104 (H) 70 - 99 mg/dL    BUN 23 8 - 23 mg/dL    Creatinine 1.19 0.70 - 1.20 mg/dL    Bun/Cre Ratio NOT REPORTED 9 - 20    Calcium 8.9 8.6 - 10.4 mg/dL    Sodium 142 135 - 144 mmol/L    Potassium 4.1 3.7 - 5.3 mmol/L    Chloride 106 98 - 107 mmol/L    CO2 22 20 - 31 mmol/L    Anion Gap 14 9 - 17 mmol/L    Alkaline Phosphatase 60 40 - 129 U/L    ALT 19 5 - 41 U/L    AST 19 <40 U/L    Total Bilirubin 0.78 0.3 - 1.2 mg/dL    Total Protein 6.5 6.4 - 8.3 g/dL    Albumin 4.1 3.5 - 5.2 g/dL    Albumin/Globulin Ratio 1.7 1.0 - 2.5    GFR Non- 59 (L) >60 mL/min    GFR African American >60 >60 mL/min    GFR Comment          GFR Staging NOT REPORTED    TSH with Reflex   Result Value Ref Range    TSH 1.97 0.30 - 5.00 mIU/L   PSA screening   Result Value Ref Range    PSA 3.72 <4.1 ug/L   Lipid Panel   Result Value Ref Range    Cholesterol 152 <200 mg/dL    HDL 59 >40 mg/dL    LDL Cholesterol 72 0 - 130 mg/dL    Chol/HDL Ratio 2.6 <5    Triglycerides 106 <150 mg/dL    VLDL NOT REPORTED 1 - 30 mg/dL   CBC   Result Value Ref Range    WBC 5.3 3.5 - 11.3 k/uL    RBC 4.82 4.21 - 5.77 m/uL    Hemoglobin 15.3 13.0 - 17.0 g/dL    Hematocrit 46.6 40.7 - 50.3 %    MCV 96.7 82.6 - 102.9 fL    MCH 31.7 25.2 - 33.5 pg    MCHC 32.8 28.4 - 34.8 g/dL    RDW 12.7 11.8 - 14.4 %    Platelets See Reflexed IPF Result 138 - 453 k/uL    MPV NOT REPORTED 8.1 - 13.5 fL    NRBC Automated 0.0 0.0 per 100 WBC   Immature Platelet Fraction   Result Value Ref Range    Platelet, Immature Fraction 4.0 1.1 - 10.3 %    Platelet, Fluorescence 135 (L) 138 - 453 k/uL     See images uploaded into media file. Dressing was removed to the right foot. Would culture was obtained and sent to the lab. The right foot was redressed with an emulsion dressing with sterile non adhesive dressing with Tegaderm. ACE wrap applied to keep secure and to prevent further irritation to the wound. Advised to finish antibiotics as previously ordered. Will place referral to wound care. Appt scheduled for tomorrow at 9:00 AM.   Keep the leg elevated. Keep the bandage on until seen and do not get the dressing wet. Pt to return if symptoms are not improving or worsening. Go to the ER for any emergent concern. Patient given educational materials - see patientinstructions. Discussed use, benefit, and side effects of prescribed medications. All patient questions answered. Pt verbalized understanding. Instructed to continue current medications, diet and exercise. Patient agreed with treatment plan. Follow up as directed.      Electronically signed by BOBBY Cross CNP on 12/27/2022 at 3:44 PM

## 2022-12-27 NOTE — TELEPHONE ENCOUNTER
A nurse practitioner from a physicians office called to set up a new patient appt for this patient. He has a wound to the top of his foot. Pictures were uploaded into media. I advised her that I would run patient pass you and see if patient could be scheduled here or at wound care. In the mean time, how would you like patients dressing to be changed?  Thanks

## 2022-12-27 NOTE — TELEPHONE ENCOUNTER
Walk in note not completed- please see if wound care was suggested    I would suggest referral to wound care  They would be the one to give home care orders for dressing changes

## 2022-12-28 ENCOUNTER — HOSPITAL ENCOUNTER (OUTPATIENT)
Dept: WOUND CARE | Age: 82
Discharge: HOME | End: 2022-12-28
Payer: MEDICARE

## 2022-12-28 VITALS
WEIGHT: 207 LBS | TEMPERATURE: 97.3 F | HEART RATE: 94 BPM | RESPIRATION RATE: 18 BRPM | DIASTOLIC BLOOD PRESSURE: 70 MMHG | BODY MASS INDEX: 29.28 KG/M2 | SYSTOLIC BLOOD PRESSURE: 127 MMHG

## 2022-12-28 DIAGNOSIS — I87.2 VENOUS INSUFFICIENCY: ICD-10-CM

## 2022-12-28 DIAGNOSIS — S91.301D OPEN WOUND OF RIGHT FOOT, SUBSEQUENT ENCOUNTER: Primary | ICD-10-CM

## 2022-12-28 PROBLEM — S91.301A OPEN WOUND OF RIGHT FOOT: Status: ACTIVE | Noted: 2022-12-28

## 2022-12-28 PROCEDURE — 99213 OFFICE O/P EST LOW 20 MIN: CPT

## 2022-12-28 PROCEDURE — 11042 DBRDMT SUBQ TIS 1ST 20SQCM/<: CPT | Performed by: NURSE PRACTITIONER

## 2022-12-28 PROCEDURE — 99202 OFFICE O/P NEW SF 15 MIN: CPT | Performed by: NURSE PRACTITIONER

## 2022-12-28 PROCEDURE — 11042 DBRDMT SUBQ TIS 1ST 20SQCM/<: CPT

## 2022-12-28 RX ORDER — LIDOCAINE HYDROCHLORIDE 20 MG/ML
JELLY TOPICAL ONCE
OUTPATIENT
Start: 2022-12-28 | End: 2022-12-28

## 2022-12-28 RX ORDER — LIDOCAINE HYDROCHLORIDE 40 MG/ML
SOLUTION TOPICAL ONCE
Status: DISCONTINUED | OUTPATIENT
Start: 2022-12-28 | End: 2022-12-29 | Stop reason: HOSPADM

## 2022-12-28 RX ORDER — LIDOCAINE HYDROCHLORIDE 40 MG/ML
SOLUTION TOPICAL ONCE
OUTPATIENT
Start: 2022-12-28 | End: 2022-12-28

## 2022-12-28 ASSESSMENT — ENCOUNTER SYMPTOMS
COUGH: 0
SHORTNESS OF BREATH: 0
NAUSEA: 0
RHINORRHEA: 0
DIARRHEA: 0
ROS SKIN COMMENTS: HEMOSIDERIN STAINING BILATERAL LOWER LEGS
VOMITING: 0

## 2022-12-28 NOTE — PROGRESS NOTES
[address]:     Other [facility]      Ordering Center:     [address] [place] [address],[address] Dept: [phone]   FAX NUMBER [unfilled]    Patient Information:      Aleta Prince [id number]   [phone]   : 1940  AGE: 79 y.o. GENDER: male   TODAYS DATE:  2022    Insurance:      PRIMARY INSURANCE:  Plan: MEDICARE PART A AND B  Coverage: MEDICARE  Effective Date: 2004  [id number] - (Medicare)    SECONDARY INSURANCE:  Plan: [employer] MEDICARE SUPP  Coverage: AETNA  Effective Date: 2022  [unfilled]    [unfilled]   [unfilled]     Patient Wound Information:      Problem List Items Addressed This Visit          Circulatory    Venous insufficiency       Other    Open wound of right foot - Primary       WOUNDS REQUIRING DRESSING SUPPLIES:     Wound 22 Foot Right;Dorsal #1 (Active)   Wound Image   22   Wound Etiology Venous 22   Dressing Status New drainage noted; Old drainage noted 22   Wound Cleansed Soap and water 22   Wound Length (cm) 2.5 cm 22   Wound Width (cm) 3.5 cm 22   Wound Depth (cm) 0.2 cm 22   Wound Surface Area (cm^2) 8.75 cm^2 22   Wound Volume (cm^3) 1.75 cm^3 22   Post-Procedure Length (cm) 2.5 cm 22   Post-Procedure Width (cm) 3.5 cm 22   Post-Procedure Depth (cm) 0.2 cm 22   Post-Procedure Surface Area (cm^2) 8.75 cm^2 22   Post-Procedure Volume (cm^3) 1.75 cm^3 22   Wound Assessment Pink/red;Slough 22   Drainage Amount Moderate 22   Drainage Description Serosanguinous; Yellow 22   Odor None 22   Tamara-wound Assessment Blanchable erythema;Fragile 22   Margins Undefined edges 22   Wound Thickness Description not for Pressure Injury Full thickness 12/28/22 0919   Number of days: 0          Supplies Requested :          WOUND #: 1   PRIMARY DRESSING:  Other: amrit   Cover and Secure with: Other excel SAP     FREQUENCY OF DRESSING CHANGES:  Daily         ADDITIONAL ITEMS:  [] Gloves Small  [x] Gloves Medium [] Gloves Large [] Gloves Nanci Ruder  [] Tape 1\" [x] Tape 2\" [] Tape 3\"  [] Medipore Tape  [x] Saline  [] Skin Prep   [] Adhesive Remover   [] Cotton Tip Applicators   [] Other:    Patient Wound(s) Debrided: [x] Yes   [] No    Debribement Type: subcutaneous tissue    Debridement Date: 12/28/2022    Is the patient currently on an antibiotic for their Wound(s): [x] Yes if yes please add name and dose     [] No  Weekly wound care visits until determined otherwise.     Patient currently being seen by Home Health: [] Yes   [x] No    Duration for needed supplies:  []15  [x]30  []60  []90 Days    Provider Information:      PROVIDER'S NAME: Marden Abide APRN-CNP    [facility]-[id number]

## 2022-12-28 NOTE — PROGRESS NOTES
[address]   Progress Note and Procedure Note      [address] [facility] RECORD NUMBER:  [id number]  AGE: 79 y.o. GENDER: male  : 1940  EPISODE DATE:  2022    Subjective:     Chief Complaint   Patient presents with    Wound Check     Right foot         HISTORY of PRESENT ILLNESS HPI     Viki Stuart is a 79 y.o. male who presents today for wound/ulcer evaluation. History of Wound Context: presents to wound clinic with wife for evaluation of right anterior foot wound that has been present for last week. It is improving but slowly. He saw PCP and was started on doxycyline and keflex which he is taking as prescribed. Wound culture done 2022 - no growth at present but results not finalized. He is covering with Telfa pad and changing daily. Does have history of venous insufficiency - saw Dr Alberto Busby in  and had ablation. Wound/Ulcer Pain Timing/Severity: intermittent  Quality of pain: sharp  Severity:  2 / 10   Modifying Factors: Pain worsens with touching  Associated Signs/Symptoms: edema    Ulcer Identification:  Ulcer Type: venous  Contributing Factors: edema and venous stasis         PAST MEDICAL HISTORY        Diagnosis Date    Cancer ([address]     basal cell    Diverticulosis     coli    Plantar fasciitis        PAST SURGICAL HISTORY    Past Surgical History:   Procedure Laterality Date    CATARACT REMOVAL WITH IMPLANT Bilateral may 2013    COLONOSCOPY      HERNIA REPAIR      SKIN CANCER EXCISION      multi sites    VEIN SURGERY         FAMILY HISTORY    History reviewed. No pertinent family history. SOCIAL HISTORY    Social History     Tobacco Use    Smoking status: Former     Packs/day: 1.50     Years: 22.00     Pack years: 33.00     Types: Cigarettes     Quit date: 1981     Years since quittin.0    Smokeless tobacco: Never   Vaping Use    Vaping Use: Never used   Substance Use Topics    Alcohol use: Yes     Comment: 1 drink daily    Drug use:  No ALLERGIES    Allergies   Allergen Reactions    Sulfa Antibiotics        MEDICATIONS    Current Outpatient Medications on File Prior to Encounter   Medication Sig Dispense Refill    doxycycline hyclate (VIBRA-TABS) 100 MG tablet Take 1 tablet by mouth 2 times daily for 10 days 20 tablet 0    cephALEXin (KEFLEX) 500 MG capsule Take 1 capsule by mouth 4 times daily for 7 days 28 capsule 0    chlorthalidone (HYGROTON) 25 MG tablet TAKE ONE TABLET BY MOUTH ONE TIME DAILY 90 tablet 0    atenolol (TENORMIN) 25 MG tablet Take 1 tablet by mouth 2 times daily 180 tablet 3    atorvastatin (LIPITOR) 10 MG tablet TAKE 1 TABLET BY MOUTH ONCE DAILY 90 tablet 3    lisinopril (PRINIVIL;ZESTRIL) 20 MG tablet Take 2 tablets by mouth daily 180 tablet 3    Multiple Vitamins-Minerals (THERAPEUTIC MULTIVITAMIN-MINERALS) tablet Take 1 tablet by mouth daily. No current facility-administered medications on file prior to encounter. REVIEW OF SYSTEMS    Review of Systems   Constitutional:  Negative for chills, fatigue and fever. HENT:  Negative for congestion and rhinorrhea. Respiratory:  Negative for cough and shortness of breath. Cardiovascular:  Positive for leg swelling. Negative for chest pain. Venous insufficiency - saw Dr Cheryl Whitehead for ablation in 2018   Gastrointestinal:  Negative for diarrhea, nausea and vomiting. Musculoskeletal:  Negative for gait problem. Skin:  Positive for wound (right anterior foot). Hemosiderin staining bilateral lower legs   Allergic/Immunologic: Negative for immunocompromised state. Neurological:  Negative for dizziness, syncope and weakness. Psychiatric/Behavioral:  Negative for agitation. The patient is not nervous/anxious.       Objective:      /70   Pulse 94   Temp 97.3 °F (36.3 °C) (Tympanic)   Resp 18   Wt 207 lb (93.9 kg)   BMI 29.28 kg/m²     Wt Readings from Last 3 Encounters:   12/28/22 207 lb (93.9 kg)   12/27/22 207 lb (93.9 kg)   12/22/22 207 lb 3.2 oz (94 kg)       PHYSICAL EXAM    General Appearance: alert and oriented to person, place and time, well-developed and well-nourished, in no acute distress  Skin: warm and dry, no rash or erythema, right anterior foot wound  Head: normocephalic and atraumatic  Eyes: pupils equal, round and conjunctivae normal  Pulmonary/Chest: normal air movement, no respiratory distress  Extremities: no cyanosis and no clubbing or edema   Musculoskeletal: no joint swelling, deformity or tenderness  Neurologic: gait, coordination normal and speech normal      Assessment:     Problem List Items Addressed This Visit       Open wound of right foot - Primary    Venous insufficiency        Procedure Note  Indications:  Based on my examination of this patient's wound(s)/ulcer(s) today, debridement is required to promote healing and evaluate the wound base. Performed by: BOBBY Clemente CNP    Consent obtained:  Yes    Time out taken:  Yes    Pain Control: Anesthetic  Anesthetic: 4% Lidocaine Liquid Topical     Debridement:Excisional Debridement    Using curette, scissors, and forceps the wound(s)/ulcer(s) was/were sharply debrided down through and including the removal of subcutaneous tissue. Devitalized Tissue Debrided:  fibrin, biofilm, and slough    Pre Debridement Measurements:  Are located in the [facility]  Documentation Flow Sheet    Wound/Ulcer #: 1    Post Debridement Measurements:  Wound/Ulcer Descriptions are Pre Debridement except measurements:    Wound 12/28/22 Foot Right;Dorsal #1 (Active)   Wound Image   12/28/22 0919   Wound Etiology Venous 12/28/22 0919   Dressing Status New drainage noted; Old drainage noted 12/28/22 0919   Wound Cleansed Soap and water 12/28/22 0919   Wound Length (cm) 2.5 cm 12/28/22 0919   Wound Width (cm) 3.5 cm 12/28/22 0919   Wound Depth (cm) 0.2 cm 12/28/22 0919   Wound Surface Area (cm^2) 8.75 cm^2 12/28/22 0919   Wound Volume (cm^3) 1.75 cm^3 12/28/22 0919 Post-Procedure Length (cm) 2.5 cm 12/28/22 0919   Post-Procedure Width (cm) 3.5 cm 12/28/22 0919   Post-Procedure Depth (cm) 0.2 cm 12/28/22 0919   Post-Procedure Surface Area (cm^2) 8.75 cm^2 12/28/22 0919   Post-Procedure Volume (cm^3) 1.75 cm^3 12/28/22 0919   Wound Assessment Pink/red;Slough 12/28/22 0919   Drainage Amount Moderate 12/28/22 0919   Drainage Description Serosanguinous; Yellow 12/28/22 0919   Odor None 12/28/22 0919   Tamara-wound Assessment Blanchable erythema;Fragile 12/28/22 0919   Margins Undefined edges 12/28/22 0919   Wound Thickness Description not for Pressure Injury Full thickness 12/28/22 0919   Number of days: 0          Percent of Wound(s)/Ulcer(s) Debrided: 100%    Total Surface Area Debrided:  8.75 sq cm     Estimated Blood Loss:  Minimal    Hemostasis Achieved:  by pressure    Procedural Pain:  2  / 10     Post Procedural Pain:  0 / 10     Response to treatment:  Well tolerated by patient. Plan:     Treatment Note please see Discharge Instructions    Written patient dismissal instructions given to patient and signed by patient or POA.            Electronically signed by BOBBY Sanchez CNP on 12/28/2022 at 9:48 AM

## 2022-12-28 NOTE — PLAN OF CARE
Problem: Discharge Planning  Goal: Discharge to home or other facility with appropriate resources  Outcome: Progressing     Problem: Wound:  Goal: Will show signs of wound healing; wound closure and no evidence of infection  Description: Will show signs of wound healing; wound closure and no evidence of infection  Outcome: Progressing     Problem: Venous:  Goal: Signs of wound healing will improve  Description: Signs of wound healing will improve  Outcome: Progressing

## 2022-12-29 LAB
CULTURE: NO GROWTH
DIRECT EXAM: ABNORMAL
DIRECT EXAM: ABNORMAL
SPECIMEN DESCRIPTION: ABNORMAL

## 2022-12-30 NOTE — TELEPHONE ENCOUNTER
Essence George from [facility] calling into office. States walk in provider called Tuesday wanting to set up patient appointment for wound on foot. Doctor wants to see patient at [facility] wound care. In regards to would orders, he would like walk in provider or PCP to do silvercel and dry sterile dressing daily. The number for [facility] is : [phone]    Contacted patient and he states that he already had appt.

## 2022-12-30 NOTE — TELEPHONE ENCOUNTER
Left message with nurse from walk in clinic. Phone number to wound care and dressing orders were given.

## 2023-01-05 ENCOUNTER — HOSPITAL ENCOUNTER (OUTPATIENT)
Dept: WOUND CARE | Age: 83
Discharge: HOME | End: 2023-01-05
Payer: MEDICARE

## 2023-01-05 VITALS
TEMPERATURE: 98 F | BODY MASS INDEX: 28.98 KG/M2 | SYSTOLIC BLOOD PRESSURE: 145 MMHG | HEART RATE: 71 BPM | DIASTOLIC BLOOD PRESSURE: 74 MMHG | WEIGHT: 207 LBS | RESPIRATION RATE: 18 BRPM | HEIGHT: 71 IN

## 2023-01-05 DIAGNOSIS — I87.2 VENOUS INSUFFICIENCY: ICD-10-CM

## 2023-01-05 DIAGNOSIS — S91.301D OPEN WOUND OF RIGHT FOOT, SUBSEQUENT ENCOUNTER: Primary | ICD-10-CM

## 2023-01-05 DIAGNOSIS — L97.821 NON-PRESSURE CHRONIC ULCER OF OTHER PART OF LEFT LOWER LEG LIMITED TO BREAKDOWN OF SKIN (HCC): ICD-10-CM

## 2023-01-05 DIAGNOSIS — L97.512 RIGHT FOOT ULCER, WITH FAT LAYER EXPOSED (HCC): ICD-10-CM

## 2023-01-05 PROCEDURE — 11042 DBRDMT SUBQ TIS 1ST 20SQCM/<: CPT

## 2023-01-05 RX ORDER — LIDOCAINE HYDROCHLORIDE 40 MG/ML
SOLUTION TOPICAL ONCE
Status: COMPLETED | OUTPATIENT
Start: 2023-01-05 | End: 2023-01-05

## 2023-01-05 RX ORDER — LIDOCAINE HYDROCHLORIDE 20 MG/ML
JELLY TOPICAL ONCE
OUTPATIENT
Start: 2023-01-05 | End: 2023-01-05

## 2023-01-05 RX ORDER — LIDOCAINE HYDROCHLORIDE 40 MG/ML
SOLUTION TOPICAL ONCE
OUTPATIENT
Start: 2023-01-05 | End: 2023-01-05

## 2023-01-05 RX ADMIN — LIDOCAINE HYDROCHLORIDE 7.5 ML: 40 SOLUTION TOPICAL at 09:29

## 2023-01-05 ASSESSMENT — PAIN SCALES - GENERAL: PAINLEVEL_OUTOF10: 0

## 2023-01-05 NOTE — PROGRESS NOTES
[facility], [facility]   Progress Note and Procedure Note      Dorian Mixon  MEDICAL RECORD NUMBER:  [id number]  AGE: 82 y.o.   GENDER: male  : 1940  EPISODE DATE:  2023    Subjective:     Chief Complaint   Patient presents with    Wound Check     Right foot         HISTORY of PRESENT ILLNESS HPI     Dorian Mixon is a 82 y.o. male who presents today for wound/ulcer evaluation.   History of Wound Context: This presents for evaluation of right dorsal foot ulceration.  He states that the wound began as a blister.  He has a lot of swelling in both legs.  Has previously seen the vein surgeon for this and had venous ablations.  He was wearing light OTC compression stockings with minimal improvement in the edema.  Also has a blister on the left pretib.    Ulcer Identification:  Ulcer Type: venous  Contributing Factors: edema and venous stasis          PAST MEDICAL HISTORY        Diagnosis Date    Cancer (HCC)     basal cell    Diverticulosis     coli    Plantar fasciitis        PAST SURGICAL HISTORY    Past Surgical History:   Procedure Laterality Date    CATARACT REMOVAL WITH IMPLANT Bilateral may 2013    COLONOSCOPY      HERNIA REPAIR      SKIN CANCER EXCISION      multi sites    VEIN SURGERY         FAMILY HISTORY    History reviewed. No pertinent family history.    SOCIAL HISTORY    Social History     Tobacco Use    Smoking status: Former     Packs/day: 1.50     Years: 22.00     Pack years: 33.00     Types: Cigarettes     Quit date: 1981     Years since quittin.0    Smokeless tobacco: Never   Vaping Use    Vaping Use: Never used   Substance Use Topics    Alcohol use: Yes     Comment: 1 drink daily    Drug use: No       ALLERGIES    Allergies   Allergen Reactions    Sulfa Antibiotics        MEDICATIONS    Current Outpatient Medications on File Prior to Encounter   Medication Sig Dispense Refill    chlorthalidone (HYGROTON) 25 MG tablet TAKE ONE TABLET BY MOUTH ONE TIME DAILY 90  tablet 0    atenolol (TENORMIN) 25 MG tablet Take 1 tablet by mouth 2 times daily 180 tablet 3    atorvastatin (LIPITOR) 10 MG tablet TAKE 1 TABLET BY MOUTH ONCE DAILY 90 tablet 3    lisinopril (PRINIVIL;ZESTRIL) 20 MG tablet Take 2 tablets by mouth daily 180 tablet 3    Multiple Vitamins-Minerals (THERAPEUTIC MULTIVITAMIN-MINERALS) tablet Take 1 tablet by mouth daily. No current facility-administered medications on file prior to encounter. REVIEW OF SYSTEMS    Review of Systems   Constitutional:  Negative for chills and fever. Skin:  Positive for wound. Objective:      BP (!) 145/74   Pulse 71   Temp 98 °F (36.7 °C) (Tympanic)   Resp 18   Ht 5' 10.5\" (1.791 m)   Wt 207 lb (93.9 kg)   BMI 29.28 kg/m²     Wt Readings from Last 3 Encounters:   01/05/23 207 lb (93.9 kg)   12/28/22 207 lb (93.9 kg)   12/27/22 207 lb (93.9 kg)       Physical Exam:  General:  Alert and oriented x3. In no acute distress. Lower Extremity Physical Exam:    Vascular: DP pulses are palpable, Bilateral. PT pulses are palpable, Bilateral. CFT <3 seconds to all digits, Bilateral.  Pitting edema, Bilateral.  Hair growth is absent to the level of the digits, Bilateral.  Hemosiderin deposition bilateral lower leg. Neuro: Saph/sural/SP/DP/plantar sensation intact to light touch. Musculoskeletal: EHL/FHL/GS/TA gross motor intact. Gross deformity is absent. Dermatologic: Open wound present to right dorsal foot and left pretibial as documented in detail below. Right dorsal foot:  Wound base is into the subcutaneous layer with fibrin and slough. Negative probe to bone. There is no erythema. There is no purulent drainage. There is no fluctuance or crepitus. Interdigital maceration absent, Bilateral.   Left pretibial:  Wound base is granular and limited to the dermal layer. Negative probe to bone. There is no erythema. There is no purulent drainage. There is no fluctuance or crepitus.       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 Discussed the importance of continued compression therapy for prevention of re-ulceration once healed. Add tubi  for now until juxta-lite wraps can be obtained. They are ordered today. Carol and silicone bordered gauze to the right foot wound and left pretibial.    Educated on signs and symptoms of infection. Instructed to call clinic immediately or go to ER if signs and symptoms of infection are present. RTC 1 week      Procedure Note  Indications:  Based on my examination of this patient's wound(s)/ulcer(s) today, debridement is required to promote healing and evaluate the wound base. Performed by: Miguel Edwards DPM    Consent obtained:  Yes    Time out taken:  Yes    Pain Control: Anesthetic  Anesthetic: 4% Lidocaine Liquid Topical       Debridement: Excisional Debridement    Using curette the wound(s)/ulcer(s) was/were sharply debrided down through and including the removal of subcutaneous tissue. Devitalized Tissue Debrided:  fibrin, biofilm, and slough    Pre Debridement Measurements:  Are located in the [place]  Documentation Flow Sheet    Wound/Ulcer #: 1    Post Debridement Measurements:  Wound/Ulcer Descriptions are Pre Debridement except measurements:    Wound 12/28/22 Foot Right;Dorsal #1 (Active)   Wound Image   12/28/22 0919   Wound Etiology Venous 01/05/23 0915   Dressing Status New drainage noted; Old drainage noted 01/05/23 0915   Wound Cleansed Cleansed with saline 01/05/23 0915   Dressing/Treatment Other (comment) 12/28/22 0959   Wound Length (cm) 2.5 cm 01/05/23 0915   Wound Width (cm) 2.9 cm 01/05/23 0915   Wound Depth (cm) 0.1 cm 01/05/23 0915   Wound Surface Area (cm^2) 7.25 cm^2 01/05/23 0915   Change in Wound Size % (l*w) 17.14 01/05/23 0915   Wound Volume (cm^3) 0.725 cm^3 01/05/23 0915   Wound Healing % 59 01/05/23 0915   Post-Procedure Length (cm) 2.5 cm 01/05/23 0915   Post-Procedure Width (cm) 2.9 cm 01/05/23 0915   Post-Procedure Depth (cm) 0.1 cm 01/05/23 0915   Post-Procedure Surface Area (cm^2) 7.25 cm^2 01/05/23 0915   Post-Procedure Volume (cm^3) 0.725 cm^3 01/05/23 0915   Wound Assessment Pink/red;Slough 01/05/23 0915   Drainage Amount Moderate 01/05/23 0915   Drainage Description Serosanguinous; Yellow 01/05/23 0915   Odor None 01/05/23 0915   Tamara-wound Assessment Blanchable erythema;Fragile 01/05/23 0915   Margins Undefined edges 01/05/23 0915   Wound Thickness Description not for Pressure Injury Full thickness 01/05/23 0915   Number of days: 8       Wound 01/05/23 Pretibial Distal;Left;Medial #2 (Active)   Wound Image   01/05/23 1000   Wound Etiology Venous 01/05/23 1000   Dressing Status Old drainage noted 01/05/23 1000   Wound Cleansed Cleansed with saline 01/05/23 1000   Wound Length (cm) 1 cm 01/05/23 1000   Wound Width (cm) 1 cm 01/05/23 1000   Wound Depth (cm) 0.1 cm 01/05/23 1000   Wound Surface Area (cm^2) 1 cm^2 01/05/23 1000   Wound Volume (cm^3) 0.1 cm^3 01/05/23 1000   Post-Procedure Length (cm) 1 cm 01/05/23 1000   Post-Procedure Width (cm) 1 cm 01/05/23 1000   Post-Procedure Depth (cm) 0.1 cm 01/05/23 1000   Post-Procedure Surface Area (cm^2) 1 cm^2 01/05/23 1000   Post-Procedure Volume (cm^3) 0.1 cm^3 01/05/23 1000   Wound Assessment Fluid filled blister 01/05/23 1000   Drainage Amount Moderate 01/05/23 1000   Drainage Description Serous 01/05/23 1000   Odor None 01/05/23 1000   Tamara-wound Assessment Blanchable erythema 01/05/23 1000   Margins Defined edges 01/05/23 1000   Wound Thickness Description not for Pressure Injury Partial thickness 01/05/23 1000   Number of days: 0          Percent of Wound(s)/Ulcer(s) Debrided: 100%    Total Surface Area Debrided:  7.25 sq cm     Diabetic/Pressure/Non Pressure Ulcers only:  Ulcer: Non-Pressure ulcer, fat layer exposed     Estimated Blood Loss:  Estimated amount of blood loss is 2ml. Hemostasis Achieved:  by pressure    Response to treatment:  Well tolerated by patient. Written patient discharge instructions given to patient and signed by patient or POA.       Orders Placed This Encounter   Medications    lidocaine (XYLOCAINE) 4 % external solution     Orders Placed This Encounter   Procedures    Initiate Outpatient Wound Care Protocol     Cleanse wound with saline    If wound contains bioburden or contamination cleanse with wound cleanser or antimicrobial solution     For normal periwound tissue without irritation nor maceration, apply topical skin protectant    For periwound tissue with irritation and/or maceration, apply zinc based product, topical steroid cream/ointment, or equivalent     For wounds with dry firm black eschar and/or without exudate, apply betadine and leave open to air      For wounds with scant/small to no exudate or drainage, apply wound gel, hydrocolloid, polymer, or equivalent and cover with secondary dressing/foam      For wounds with moderate/large exudate or drainage, apply alginate, hydrofiber, polymer, or equivalent and cover with secondary dressing/foam    For wounds with nonviable tissue requiring removal, apply chemical or mechanical debrider and cover with secondary dressing/foam    For wounds with tunneling, dead space, or cavity, fill or pack with strip/gauze/kerlex to fit and cover with secondary dressing/foam    For wounds with adequate granulation or epithelization, apply wound gel, hydrocolloid, polymer, collagen, or transparent film, and cover secondary dry dressing/foam    For wounds that need additional secondary dressing to help pad or control additional drainage/exudates, add foam, absorbent pad or hydrocolloid    For wounds with suspected or known infection, apply antimicrobial mesh and/or antimicrobial alginate/hydrofiber, or antimicrobial solution moistened gauze/kerlex, or equivalent and cover with secondary dressing/foam    Compression Management needed for edema control, apply multilayer compression or tubular garment or equivalent    Offloading Management needed for pressure relief, apply offloading shoe/boot or equivalent     Standing Status:   Standing     Number of Occurrences:   1          Discharge Instructions           [address] and [address] [facility]                            Visit  Discharge Instructions / Physician Orders  DATE: 1/5/23     Home Care: none     SUPPLIES ORDERED THRU:      [employer]               DATE LAST SUPPLIED 12/28/22  Velcro compression stockings ordered 1/5/22 from [employer]  If you have not heard from the dressing supply company within 2 -3 days of visit please call company to check on the status of your order. [employer]- [phone]       Wound Location:  right dorsal foot     Cleanse with: Liquid antibacterial soap and water, rinse well      Dressing Orders:  Primary dressing   amrit                    Secondary dressing     gentac                      secure with    tubi , ace wrap toes to knee      x 30days     Frequency:  once daily     Additional Orders: Increase protein to diet (meat, cheese, eggs, fish, peanut butter, nuts and beans)  Multivitamin daily     OFFLOADING [] YES  TYPE:                  [x] NA     Weekly wound care visits until determined otherwise.      Antibiotic therapy-wound care related YES [x] NO [] NA[]     MY CHART []     Smart Device  []            Your next appointment with the [facility] is in 1 week                                                                                                   (Please note your next appointment above and if you are unable to keep, kindly give a 24 hour notice. Thank you.)  If more than 15 min late we cannot guarantee you will be seen due to clinician schedule  Per Policy, Excessive cancellation will call for dismissal from program.  If you experience any of the following, please call the [facility] during business hours:  [phone]     * Increase in Pain  * Temperature over 101  * Increase in drainage from your wound  * Drainage with a foul odor  * Bleeding  * Increase in swelling  * Need for compression bandage changes due to slippage, breakthrough drainage. If you need medical attention outside of the business hours of the [facility] please contact your PCP or go to the nearest emergency room. The information contained in the After Visit Summary has been reviewed with me, the patient and/or responsible adult, by my health care provider(s). I had the opportunity to ask questions regarding this information.  I have elected to receive;      []After Visit Summary  [x]Comprehensive Discharge Instruction        Patient signature______________________________________Date:________  Electronically signed by Amy Moore RN on 1/5/2023 at 9:53 AM   Electronically signed by Brandi Villavicencio DPM on 1/5/2023 at 9:16 AM        Electronically signed by Brandi Villavicencio DPM on 1/5/2023 at 10:03 AM

## 2023-01-05 NOTE — PLAN OF CARE
Problem: Discharge Planning  Goal: Discharge to home or other facility with appropriate resources  Outcome: Progressing     Problem: Pain  Goal: Verbalizes/displays adequate comfort level or baseline comfort level  Outcome: Progressing     Problem: Skin/Tissue Integrity - Adult  Goal: Skin integrity remains intact  Outcome: Progressing  Goal: Incisions, wounds, or drain sites healing without S/S of infection  Outcome: Progressing

## 2023-01-05 NOTE — PROGRESS NOTES
Compression [facility] for Compression Stockings:     Other [employer]      Ordering Center:     [address] [place] [address]  WOUND CARE Dept: [phone]   FAX NUMBER [phone]    Patient Information:      Anastacio Garcia [id number]   [phone]   : 1940  AGE: 79 y.o. GENDER: male   TODAYS DATE:  2023    Insurance:      PRIMARY INSURANCE:  Plan: MEDICARE PART A AND B  Coverage: MEDICARE  Effective Date: 2004  Group Number: [unfilled]  Subscriber Number: [id number] - (Medicare)    Payer/Plan Subscr  Sex Relation Sub. Ins. ID Effective Group Num   1. Albino Rodrigues 1940 Male Self [id number]                                    [address] [id number]   2. 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 ([address]       Wound 22 Foot Right;Dorsal #1 (Active)   Wound Image   22 0919   Wound Etiology Venous 23   Dressing Status New drainage noted; Old drainage noted 2315   Wound Cleansed Cleansed with saline 2315   Dressing/Treatment Other (comment) 22 0959   Wound Length (cm) 2.5 cm 23 0915   Wound Width (cm) 2.9 cm 2315   Wound Depth (cm) 0.1 cm 2315   Wound Surface Area (cm^2) 7.25 cm^2 2315   Change in Wound Size % (l*w) 17.14 23 0915   Wound Volume (cm^3) 0.725 cm^3 23 0915   Wound Healing % 59 01/05/23 0915   Post-Procedure Length (cm) 2.5 cm 01/05/23 0915   Post-Procedure Width (cm) 2.9 cm 01/05/23 0915   Post-Procedure Depth (cm) 0.1 cm 01/05/23 0915   Post-Procedure Surface Area (cm^2) 7.25 cm^2 01/05/23 0915   Post-Procedure Volume (cm^3) 0.725 cm^3 01/05/23 0915   Wound Assessment Pink/red;Slough 01/05/23 0915   Drainage Amount Moderate 01/05/23 0915   Drainage Description Serosanguinous; Yellow 01/05/23 0915   Odor None 01/05/23 0915   Tamara-wound Assessment Blanchable erythema;Fragile 01/05/23 0915   Margins Undefined edges 01/05/23 0915   Wound Thickness Description not for Pressure Injury Full thickness 01/05/23 0915   Number of days: 8       Wound 01/05/23 Pretibial Distal;Left;Medial #2 (Active)   Wound Image   01/05/23 1000   Wound Etiology Venous 01/05/23 1000   Dressing Status Old drainage noted 01/05/23 1000   Wound Cleansed Cleansed with saline 01/05/23 1000   Wound Length (cm) 1 cm 01/05/23 1000   Wound Width (cm) 1 cm 01/05/23 1000   Wound Depth (cm) 0.1 cm 01/05/23 1000   Wound Surface Area (cm^2) 1 cm^2 01/05/23 1000   Wound Volume (cm^3) 0.1 cm^3 01/05/23 1000   Post-Procedure Length (cm) 1 cm 01/05/23 1000   Post-Procedure Width (cm) 1 cm 01/05/23 1000   Post-Procedure Depth (cm) 0.1 cm 01/05/23 1000   Post-Procedure Surface Area (cm^2) 1 cm^2 01/05/23 1000   Post-Procedure Volume (cm^3) 0.1 cm^3 01/05/23 1000   Wound Assessment Fluid filled blister 01/05/23 1000   Drainage Amount Moderate 01/05/23 1000   Drainage Description Serous 01/05/23 1000   Odor None 01/05/23 1000   Tamara-wound Assessment Blanchable erythema 01/05/23 1000   Margins Defined edges 01/05/23 1000   Wound Thickness Description not for Pressure Injury Partial thickness 01/05/23 1000   Number of days: 0       Right Leg Measurements: (ALL measurements are in cm)  Right Leg Edema Point of Measurement  Great toe to forefoot: 10 cm  Heel to ankle: 10 cm  Heel to calf: 30  Leg circumference: 40.5 cm  Ankle circumference: 25 cm  Foot circumference: 24 cm  Compression Therapy: Compression ordered, Ace wrap    Ankle to knee: 40 cm    Left Leg Measurements: (ALL measurements are in cm)  Left Leg Edema Point of Measurement  Great toe to forefoot: 10 cm  Heel to ankle: 10 cm  Heel to calf: 30  Leg circumference: 38 cm  Ankle circumference: 25 cm  Foot circumference: 24 cm  Compression Therapy: Compression not ordered    Ankle to knee: 38 cm    Supplies Requested :     Medicare Requirements  Patient must have a qualifying Active Venus Ulcer if ordering Bilateral Compression Wounds MUST be present on both legs for Medicare Coverage. The patient can Not be on home health or have had a Medicare part A stay in the past 24 hours.     Patient Wound(s) Debrided: [x] Yes if yes please add date 1/5/22   [] No    Debribement Type: Excisional/Sharp    Patient currently being seen by Home Health: [] Yes   [x] No     Compression Type: Circaid Juxtalite, HD, 30-40 mm/Hg, BILATERAL lower legs     Provider Information:      PROVIDER'S NAMEKorina Musa DPM  QWD-[id number]

## 2023-01-08 NOTE — DISCHARGE INSTRUCTIONS
[address]                            Visit  Discharge Instructions / Physician Orders  DATE: 1/12/23     Home Care: none     SUPPLIES ORDERED THRU:      [employer]               DATE LAST SUPPLIED 12/28/22  Velcro compression stockings ordered 1/5/22 from [employer]  If you have not heard from the dressing supply company within 2 -3 days of visit please call company to check on the status of your order. [employer]- [phone]        Wound Location:  right dorsal foot     Cleanse with: Liquid antibacterial soap and water, rinse well      Dressing Orders:  Primary dressing   amrit                    Secondary dressing     gentac                      secure with   juxtalite wraps, set to 30-40 mmhg    x 30days     Frequency:  once daily     Additional Orders: Increase protein to diet (meat, cheese, eggs, fish, peanut butter, nuts and beans)  Multivitamin daily     OFFLOADING [] YES  TYPE:                  [x] NA     Weekly wound care visits until determined otherwise. Antibiotic therapy-wound care related YES [] NO [] NA[]     MY CHART []     Smart Device  []            Your next appointment with the [address] [place] [address] is in 1 week                                                                                                   (Please note your next appointment above and if you are unable to keep, kindly give a 24 hour notice.  Thank you.)  If more than 15 min late we cannot guarantee you will be seen due to clinician schedule  Per Policy, Excessive cancellation will call for dismissal from program.  If you experience any of the following, please call the [address] [place] [address] during business hours:  [phone]     * Increase in Pain  * Temperature over 101  * Increase in drainage from your wound  * Drainage with a foul odor  * Bleeding  * Increase in swelling  * Need for compression bandage changes due to slippage, breakthrough drainage. If you need medical attention outside of the business hours of the [address] [place] [address] Road please contact your PCP or go to the nearest emergency room. The information contained in the After Visit Summary has been reviewed with me, the patient and/or responsible adult, by my health care provider(s). I had the opportunity to ask questions regarding this information.  I have elected to receive;      []After Visit Summary  [x]Comprehensive Discharge Instruction        Patient signature______________________________________Date:________  Electronically signed by Devang Sorto RN on 1/12/2023 at 9:49 AM   Electronically signed by Emily Candelario DPM on 1/12/2023 at 9:21 AM

## 2023-01-12 ENCOUNTER — HOSPITAL ENCOUNTER (OUTPATIENT)
Dept: WOUND CARE | Age: 83
Discharge: HOME | End: 2023-01-12
Payer: MEDICARE

## 2023-01-12 VITALS
BODY MASS INDEX: 28.98 KG/M2 | WEIGHT: 207 LBS | TEMPERATURE: 96.7 F | HEART RATE: 71 BPM | DIASTOLIC BLOOD PRESSURE: 81 MMHG | HEIGHT: 71 IN | SYSTOLIC BLOOD PRESSURE: 112 MMHG | RESPIRATION RATE: 16 BRPM

## 2023-01-12 DIAGNOSIS — I87.2 VENOUS INSUFFICIENCY: ICD-10-CM

## 2023-01-12 DIAGNOSIS — R60.0 LOCALIZED EDEMA: ICD-10-CM

## 2023-01-12 DIAGNOSIS — S91.301D OPEN WOUND OF RIGHT FOOT, SUBSEQUENT ENCOUNTER: Primary | ICD-10-CM

## 2023-01-12 PROCEDURE — 99213 OFFICE O/P EST LOW 20 MIN: CPT

## 2023-01-12 RX ORDER — LIDOCAINE HYDROCHLORIDE 20 MG/ML
JELLY TOPICAL ONCE
OUTPATIENT
Start: 2023-01-12 | End: 2023-01-12

## 2023-01-12 RX ORDER — LIDOCAINE HYDROCHLORIDE 40 MG/ML
SOLUTION TOPICAL ONCE
OUTPATIENT
Start: 2023-01-12 | End: 2023-01-12

## 2023-01-12 RX ORDER — LIDOCAINE HYDROCHLORIDE 40 MG/ML
SOLUTION TOPICAL ONCE
Status: COMPLETED | OUTPATIENT
Start: 2023-01-12 | End: 2023-01-12

## 2023-01-12 RX ADMIN — LIDOCAINE HYDROCHLORIDE 10 ML: 40 SOLUTION TOPICAL at 09:26

## 2023-01-12 NOTE — PLAN OF CARE
Nurse Triage SBAR    Is this a 2nd Level Triage? NO    Situation: patient has pain on urination.    Background: Patient fell on Saturday and hit her crotch on the baby gate.    Assessment: She is stating that she has pain when she urinates. This started when she fell on the baby gate.    No fever.  No blood in her urine    Protocol Recommended Disposition:   Go to ED Now    Recommendation: mother advised to take patient to the ER. Other agrees with the plan.     Daniela Howe RN on 9/26/2022 at 7:24 PM        .      Reason for Disposition    Painful urination    Additional Information    Negative: [1] Major bleeding (actively dripping or spurting) AND [2] can't be stopped    Negative: [1] Major blood loss AND [2] has fainted or too weak to stand    Negative: Sounds like a life-threatening emergency to the triager    Negative: [1] Injury is mild AND [2] sexual abuse suspected    Negative: Rape or forced sexual intercourse occurred    Negative: Foreign body in vagina is main concern    Negative: Pulled groin muscle    Negative: Wound infection suspected (cut or other wound now looks infected)    Negative: [1] External bleeding AND [2] won't stop after 10 minutes of direct pressure (using correct technique)    Negative: Skin is split open or gaping (if unsure, refer in if cut length > 1/2  inch or 12 mm)    Negative: Bleeding from inside the vagina  (Exception: a small spot of blood near the vagina is often from a small cut that has already sealed over, not from the vagina)    Negative: Vaginal injury with a penetrating object    Protocols used: GENITAL INJURY - FEMALE-P-       Problem: Discharge Planning  Goal: Discharge to home or other facility with appropriate resources  Outcome: Progressing     Problem: Skin/Tissue Integrity - Adult  Goal: Skin integrity remains intact  Outcome: Progressing  Goal: Incisions, wounds, or drain sites healing without S/S of infection  Outcome: Progressing     Problem: Safety - Adult  Goal: Free from fall injury  Outcome: Progressing

## 2023-01-12 NOTE — PROGRESS NOTES
[address]   Progress Note and Procedure Note      [address] [facility] RECORD NUMBER:  [id number]  AGE: 79 y.o. GENDER: male  : 1940  EPISODE DATE:  2023    Subjective:     Chief Complaint   Patient presents with    Wound Check     Right foot         HISTORY of PRESENT ILLNESS CHRIS Ayala is a 79 y.o. male who presents today for wound/ulcer evaluation. Interval history: [facility] the juxta lite wraps and brings them in today. The left leg blister has healed. The right dorsal foot wound has improved. History of Wound Context: This presents for evaluation of right dorsal foot ulceration. He states that the wound began as a blister. He has a lot of swelling in both legs. Has previously seen the vein surgeon for this and had venous ablations. He was wearing light OTC compression stockings with minimal improvement in the edema. Also has a blister on the left pretib. Ulcer Identification:  Ulcer Type: venous  Contributing Factors: edema and venous stasis          PAST MEDICAL HISTORY        Diagnosis Date    Cancer ([address]     basal cell    Diverticulosis     coli    Plantar fasciitis        PAST SURGICAL HISTORY    Past Surgical History:   Procedure Laterality Date    CATARACT REMOVAL WITH IMPLANT Bilateral may 2013    COLONOSCOPY      HERNIA REPAIR      SKIN CANCER EXCISION      multi sites    VEIN SURGERY         FAMILY HISTORY    History reviewed. No pertinent family history.     SOCIAL HISTORY    Social History     Tobacco Use    Smoking status: Former     Packs/day: 1.50     Years: 22.00     Pack years: 33.00     Types: Cigarettes     Quit date: 1981     Years since quittin.0    Smokeless tobacco: Never   Vaping Use    Vaping Use: Never used   Substance Use Topics    Alcohol use: Yes     Comment: 1 drink daily    Drug use: No       ALLERGIES    Allergies   Allergen Reactions    Sulfa Antibiotics        MEDICATIONS    Current Outpatient Medications on File Prior to Encounter   Medication Sig Dispense Refill    chlorthalidone (HYGROTON) 25 MG tablet TAKE ONE TABLET BY MOUTH ONE TIME DAILY 90 tablet 0    atenolol (TENORMIN) 25 MG tablet Take 1 tablet by mouth 2 times daily 180 tablet 3    atorvastatin (LIPITOR) 10 MG tablet TAKE 1 TABLET BY MOUTH ONCE DAILY 90 tablet 3    lisinopril (PRINIVIL;ZESTRIL) 20 MG tablet Take 2 tablets by mouth daily 180 tablet 3    Multiple Vitamins-Minerals (THERAPEUTIC MULTIVITAMIN-MINERALS) tablet Take 1 tablet by mouth daily. No current facility-administered medications on file prior to encounter. REVIEW OF SYSTEMS    Review of Systems   Constitutional:  Negative for chills and fever. Skin:  Positive for wound. Objective:      /81   Pulse 71   Temp (!) 96.7 °F (35.9 °C) (Tympanic)   Resp 16   Ht 5' 10.5\" (1.791 m)   Wt 207 lb (93.9 kg)   BMI 29.28 kg/m²     Wt Readings from Last 3 Encounters:   01/12/23 207 lb (93.9 kg)   01/05/23 207 lb (93.9 kg)   12/28/22 207 lb (93.9 kg)       Physical Exam:  General:  Alert and oriented x3. In no acute distress. Lower Extremity Physical Exam:    Vascular: DP pulses are palpable, Bilateral. PT pulses are palpable, Bilateral. CFT <3 seconds to all digits, Bilateral.  Pitting edema, Bilateral.  Hair growth is absent to the level of the digits, Bilateral.  Hemosiderin deposition bilateral lower leg. Neuro: Saph/sural/SP/DP/plantar sensation intact to light touch. Musculoskeletal: EHL/FHL/GS/TA gross motor intact. Gross deformity is absent. Dermatologic: Open wound present to right dorsal foot as documented in detail below. Right dorsal foot:  Wound base is into the subcutaneous layer. Base is mostly granular with epithelialization at the margins. Negative probe to bone. There is no erythema. There is no purulent drainage. There is no fluctuance or crepitus.  Interdigital maceration absent, Bilateral.     Wound 12/28/22 Foot Right;Dorsal #1 (Active)   Wound Image   12/28/22 0919   Wound Etiology Venous 01/12/23 0927   Dressing Status New drainage noted; Old drainage noted 01/12/23 0927   Wound Cleansed Soap and water 01/12/23 0927   Dressing/Treatment Other (comment) 12/28/22 0959   Wound Length (cm) 1.5 cm 01/12/23 0927   Wound Width (cm) 1 cm 01/12/23 0927   Wound Depth (cm) 0.1 cm 01/12/23 0927   Wound Surface Area (cm^2) 1.5 cm^2 01/12/23 0927   Change in Wound Size % (l*w) 82.86 01/12/23 0927   Wound Volume (cm^3) 0.15 cm^3 01/12/23 0927   Wound Healing % 91 01/12/23 0927   Post-Procedure Length (cm) 1.5 cm 01/12/23 0927   Post-Procedure Width (cm) 1 cm 01/12/23 0927   Post-Procedure Depth (cm) 0.1 cm 01/12/23 0927   Post-Procedure Surface Area (cm^2) 1.5 cm^2 01/12/23 0927   Post-Procedure Volume (cm^3) 0.15 cm^3 01/12/23 0927   Wound Assessment Pink/red 01/12/23 0927   Drainage Amount Moderate 01/12/23 0927   Drainage Description Serosanguinous; Yellow 01/12/23 0927   Odor None 01/12/23 0927   Tamara-wound Assessment Blanchable erythema;Fragile 01/12/23 0927   Margins Undefined edges 01/12/23 0927   Wound Thickness Description not for Pressure Injury Full thickness 01/12/23 0927   Number of days: 15       Wound 01/05/23 Pretibial Distal;Left;Medial #2 (Active)   Wound Image   01/12/23 0927   Wound Etiology Venous 01/12/23 0927   Dressing Status Dry 01/12/23 0927   Wound Cleansed Cleansed with saline 01/05/23 1000   Wound Length (cm) 0 cm 01/12/23 0927   Wound Width (cm) 0 cm 01/12/23 0927   Wound Depth (cm) 0 cm 01/12/23 0927   Wound Surface Area (cm^2) 0 cm^2 01/12/23 0927   Change in Wound Size % (l*w) 100 01/12/23 0927   Wound Volume (cm^3) 0 cm^3 01/12/23 0927   Wound Healing % 100 01/12/23 0927   Post-Procedure Length (cm) 0 cm 01/12/23 0927   Post-Procedure Width (cm) 0 cm 01/12/23 0927   Post-Procedure Depth (cm) 0 cm 01/12/23 0927   Post-Procedure Surface Area (cm^2) 0 cm^2 01/12/23 0927   Post-Procedure Volume (cm^3) 0 cm^3 01/12/23 0927   Wound Assessment Fluid filled blister 01/05/23 1000   Drainage Amount Moderate 01/05/23 1000   Drainage Description Serous 01/05/23 1000   Odor None 01/05/23 1000   Tamara-wound Assessment Blanchable erythema 01/05/23 1000   Margins Defined edges 01/05/23 1000   Wound Thickness Description not for Pressure Injury Partial thickness 01/05/23 1000   Number of days: 6            Assessment:      Active Hospital Problems    Diagnosis Date Noted    Localized edema [R60.0] 01/12/2023     Priority: Medium    Right foot ulcer, with fat layer exposed (HCC) [L97.512] 01/05/2023     Priority: Medium    Venous insufficiency [I87.2] 12/28/2022     Priority: Medium       Plan:     Treatment Note please see attached Discharge Instructions    Discussed the importance of compression therapy for healing of a leg ulceration in the setting of venous insufficiency and lymphedema.  Discussed the importance of continued compression therapy for prevention of re-ulceration once healed.    Begin use of juxta lite wraps daily. Instructed on application today.     Carol and silicone bordered gauze to the right foot wound daily    Educated on signs and symptoms of infection. Instructed to call clinic immediately or go to ER if signs and symptoms of infection are present.     RTC 1 week      Written patient discharge instructions given to patient and signed by patient or POA.      Orders Placed This Encounter   Medications    lidocaine (XYLOCAINE) 4 % external solution     Orders Placed This Encounter   Procedures    Initiate Outpatient Wound Care Protocol     Cleanse wound with saline    If wound contains bioburden or contamination cleanse with wound cleanser or antimicrobial solution     For normal periwound tissue without irritation nor maceration, apply topical skin protectant    For periwound tissue with irritation and/or maceration, apply zinc based product, topical steroid cream/ointment, or equivalent     For wounds  with dry firm black eschar and/or without exudate, apply betadine and leave open to air      For wounds with scant/small to no exudate or drainage, apply wound gel, hydrocolloid, polymer, or equivalent and cover with secondary dressing/foam      For wounds with moderate/large exudate or drainage, apply alginate, hydrofiber, polymer, or equivalent and cover with secondary dressing/foam    For wounds with nonviable tissue requiring removal, apply chemical or mechanical debrider and cover with secondary dressing/foam    For wounds with tunneling, dead space, or cavity, fill or pack with strip/gauze/kerlex to fit and cover with secondary dressing/foam    For wounds with adequate granulation or epithelization, apply wound gel, hydrocolloid, polymer, collagen, or transparent film, and cover secondary dry dressing/foam    For wounds that need additional secondary dressing to help pad or control additional drainage/exudates, add foam, absorbent pad or hydrocolloid    For wounds with suspected or known infection, apply antimicrobial mesh and/or antimicrobial alginate/hydrofiber, or antimicrobial solution moistened gauze/kerlex, or equivalent and cover with secondary dressing/foam    Compression Management needed for edema control, apply multilayer compression or tubular garment or equivalent    Offloading Management needed for pressure relief, apply offloading shoe/boot or equivalent     Standing Status:   Standing     Number of Occurrences:   1          Discharge Instructions           [address] [facility]                            Visit  Discharge Instructions / Physician Orders  DATE: 1/12/23     Home Care: none     SUPPLIES ORDERED THRU:      [facility]               DATE LAST SUPPLIED 12/28/22  Velcro compression stockings ordered 1/5/22 from [employer]  If you have not heard from the dressing supply company within 2 -3 days of visit please call company to check on the status of your order. [employer]- [phone]        Wound Location:  right dorsal foot     Cleanse with: Liquid antibacterial soap and water, rinse well      Dressing Orders:  Primary dressing   amrit                    Secondary dressing     gentac                      secure with   juxtalite wraps, set to 30-40 mmhg    x 30days     Frequency:  once daily     Additional Orders: Increase protein to diet (meat, cheese, eggs, fish, peanut butter, nuts and beans)  Multivitamin daily     OFFLOADING [] YES  TYPE:                  [x] NA     Weekly wound care visits until determined otherwise. Antibiotic therapy-wound care related YES [] NO [] NA[]     MY CHART []     Smart Device  []            Your next appointment with the [facility] is in 1 week                                                                                                   (Please note your next appointment above and if you are unable to keep, kindly give a 24 hour notice. Thank you.)  If more than 15 min late we cannot guarantee you will be seen due to clinician schedule  Per Policy, Excessive cancellation will call for dismissal from program.  If you experience any of the following, please call the [facility] during business hours:  [phone]     * Increase in Pain  * Temperature over 101  * Increase in drainage from your wound  * Drainage with a foul odor  * Bleeding  * Increase in swelling  * Need for compression bandage changes due to slippage, breakthrough drainage. If you need medical attention outside of the business hours of the [facility] please contact your PCP or go to the nearest emergency room. The information contained in the After Visit Summary has been reviewed with me, the patient and/or responsible adult, by my health care provider(s). I had the opportunity to ask questions regarding this information.  I have elected to receive;      []After Visit Summary  [x]Comprehensive Discharge Instruction        Patient signature______________________________________Date:________  Electronically signed by Marcell Pickering RN on 1/12/2023 at 9:49 AM   Electronically signed by Jasmine Ma DPM on 1/12/2023 at 9:21 AM        Electronically signed by Jasmine Ma DPM on 1/12/2023 at 9:51 AM

## 2023-01-15 NOTE — DISCHARGE INSTRUCTIONS
[id number]                            Visit  Discharge Instructions / Physician Orders  DATE: 1/19/23     Home Care: none     SUPPLIES ORDERED THRU:      [employer] [facility]               DATE LAST SUPPLIED 12/28/22  Velcro compression stockings ordered 1/5/22 from [employer] [facility]  If you have not heard from the dressing supply company within 2 -3 days of visit please call company to check on the status of your order. [employer]- [phone]        Wound Location:  right dorsal foot     Cleanse with: Liquid antibacterial soap and water, rinse well      Dressing Orders:    gentac  to protect new skin                    secure with   juxtalite wraps, set to 30-40 mmhg    x 30days     Frequency:  once daily     Additional Orders: Increase protein to diet (meat, cheese, eggs, fish, peanut butter, nuts and beans)  Multivitamin daily     OFFLOADING [] YES  TYPE:                  [x] NA     Weekly wound care visits until determined otherwise. Antibiotic therapy-wound care related YES [] NO [] NA[]     MY CHART []     Smart Device  []            [facility]                                                                                                   (Please note your next appointment above and if you are unable to keep, kindly give a 24 hour notice. Thank you.)  If more than 15 min late we cannot guarantee you will be seen due to clinician schedule  Per Policy, Excessive cancellation will call for dismissal from program.  If you experience any of the following, please [facility] during business hours:  [phone]     * Increase in Pain  * Temperature over 101  * Increase in drainage from your wound  * Drainage with a foul odor  * Bleeding  * Increase in swelling  * Need for compression bandage changes due to slippage, breakthrough drainage.      If you need medical attention outside of the business hours of the [address] [place] [address] Road please contact your PCP or go to the nearest emergency room. The information contained in the After Visit Summary has been reviewed with me, the patient and/or responsible adult, by my health care provider(s). I had the opportunity to ask questions regarding this information.  I have elected to receive;      []After Visit Summary  [x]Comprehensive Discharge Instruction        Patient signature______________________________________Date:________  Electronically signed by Grady Reilly RN on 1/19/2023 at 10:07 AM   Electronically signed by Rm Donis DPM on 1/19/2023 at 9:29 AM

## 2023-01-19 ENCOUNTER — HOSPITAL ENCOUNTER (OUTPATIENT)
Dept: WOUND CARE | Age: 83
Discharge: HOME | End: 2023-01-19
Payer: MEDICARE

## 2023-01-19 VITALS
HEIGHT: 70 IN | SYSTOLIC BLOOD PRESSURE: 123 MMHG | BODY MASS INDEX: 29.63 KG/M2 | HEART RATE: 63 BPM | DIASTOLIC BLOOD PRESSURE: 59 MMHG | TEMPERATURE: 97.5 F | RESPIRATION RATE: 18 BRPM | WEIGHT: 207 LBS

## 2023-01-19 DIAGNOSIS — S91.301D OPEN WOUND OF RIGHT FOOT, SUBSEQUENT ENCOUNTER: Primary | ICD-10-CM

## 2023-01-19 DIAGNOSIS — I87.2 VENOUS INSUFFICIENCY: ICD-10-CM

## 2023-01-19 PROCEDURE — 99212 OFFICE O/P EST SF 10 MIN: CPT

## 2023-01-19 PROCEDURE — 99213 OFFICE O/P EST LOW 20 MIN: CPT

## 2023-01-19 RX ORDER — LIDOCAINE HYDROCHLORIDE 20 MG/ML
JELLY TOPICAL ONCE
Status: CANCELLED | OUTPATIENT
Start: 2023-01-19 | End: 2023-01-19

## 2023-01-19 RX ORDER — LIDOCAINE HYDROCHLORIDE 40 MG/ML
SOLUTION TOPICAL ONCE
Status: COMPLETED | OUTPATIENT
Start: 2023-01-19 | End: 2023-01-19

## 2023-01-19 RX ORDER — LIDOCAINE HYDROCHLORIDE 40 MG/ML
SOLUTION TOPICAL ONCE
Status: CANCELLED | OUTPATIENT
Start: 2023-01-19 | End: 2023-01-19

## 2023-01-19 RX ADMIN — LIDOCAINE HYDROCHLORIDE 5 ML: 40 SOLUTION TOPICAL at 09:55

## 2023-01-19 ASSESSMENT — PAIN SCALES - GENERAL: PAINLEVEL_OUTOF10: 0

## 2023-01-19 NOTE — PLAN OF CARE
Problem: Discharge Planning  Goal: Discharge to home or other facility with appropriate resources  Outcome: Progressing     Problem: Skin/Tissue Integrity - Adult  Goal: Skin integrity remains intact  Outcome: Progressing  Goal: Incisions, wounds, or drain sites healing without S/S of infection  Outcome: Progressing     Problem: Safety - Adult  Goal: Free from fall injury  Outcome: Progressing

## 2023-01-19 NOTE — PROGRESS NOTES
[address]   Progress Note and Procedure Note      [address] [facility] RECORD NUMBER:  [id number]  AGE: 79 y.o. GENDER: male  : 1940  EPISODE DATE:  2023    Subjective:     Chief Complaint   Patient presents with    Wound Check     Right dorsal foot         HISTORY of PRESENT ILLNESS HPI     Pepper Mixon is a 79 y.o. male who presents today for wound/ulcer evaluation. Interval history: Saima Will has received the juxta lite wraps and has done well with them. The ulceration has healed. History of Wound Context: This presents for evaluation of right dorsal foot ulceration. He states that the wound began as a blister. He has a lot of swelling in both legs. Has previously seen the vein surgeon for this and had venous ablations. He was wearing light OTC compression stockings with minimal improvement in the edema. Also has a blister on the left pretib. Ulcer Identification:  Ulcer Type: venous  Contributing Factors: edema and venous stasis          PAST MEDICAL HISTORY        Diagnosis Date    Cancer ([facility] [address]     basal cell    Diverticulosis     coli    Plantar fasciitis        PAST SURGICAL HISTORY    Past Surgical History:   Procedure Laterality Date    CATARACT REMOVAL WITH IMPLANT Bilateral may 2013    COLONOSCOPY      HERNIA REPAIR      SKIN CANCER EXCISION      multi sites    VEIN SURGERY         FAMILY HISTORY    History reviewed. No pertinent family history.     SOCIAL HISTORY    Social History     Tobacco Use    Smoking status: Former     Packs/day: 1.50     Years: 22.00     Pack years: 33.00     Types: Cigarettes     Quit date: 1981     Years since quittin.0    Smokeless tobacco: Never   Vaping Use    Vaping Use: Never used   Substance Use Topics    Alcohol use: Yes     Comment: 1 drink daily    Drug use: No       ALLERGIES    Allergies   Allergen Reactions    Sulfa Antibiotics        MEDICATIONS    Current Outpatient Medications on File Prior to Encounter   Medication Sig Dispense Refill    chlorthalidone (HYGROTON) 25 MG tablet TAKE ONE TABLET BY MOUTH ONE TIME DAILY 90 tablet 0    atenolol (TENORMIN) 25 MG tablet Take 1 tablet by mouth 2 times daily 180 tablet 3    atorvastatin (LIPITOR) 10 MG tablet TAKE 1 TABLET BY MOUTH ONCE DAILY 90 tablet 3    lisinopril (PRINIVIL;ZESTRIL) 20 MG tablet Take 2 tablets by mouth daily 180 tablet 3    Multiple Vitamins-Minerals (THERAPEUTIC MULTIVITAMIN-MINERALS) tablet Take 1 tablet by mouth daily. No current facility-administered medications on file prior to encounter. REVIEW OF SYSTEMS    Review of Systems   Constitutional:  Negative for chills and fever. Skin:  Negative for wound. Objective:      BP (!) 123/59   Pulse 63   Temp 97.5 °F (36.4 °C) (Tympanic)   Resp 18   Ht 5' 10\" (1.778 m)   Wt 207 lb (93.9 kg)   BMI 29.70 kg/m²     Wt Readings from Last 3 Encounters:   01/19/23 207 lb (93.9 kg)   01/12/23 207 lb (93.9 kg)   01/05/23 207 lb (93.9 kg)       Physical Exam:  General:  Alert and oriented x3. In no acute distress. Lower Extremity Physical Exam:    Vascular: DP pulses are palpable, Bilateral. PT pulses are palpable, Bilateral. CFT <3 seconds to all digits, Bilateral.  Pitting edema, Bilateral improved where wraps were present. Hair growth is absent to the level of the digits, Bilateral.  Hemosiderin deposition bilateral lower leg. Neuro: Saph/sural/SP/DP/plantar sensation intact to light touch. Musculoskeletal: EHL/FHL/GS/TA gross motor intact. Gross deformity is absent. Dermatologic: Open wound present to right dorsal foot as documented in detail below. Right dorsal foot:  No ulceration. There is no erythema. There is no drainage. There is no fluctuance or crepitus.  Interdigital maceration absent, Bilateral.     Wound 12/28/22 Foot Right;Dorsal #1 (Active)   Wound Image   12/28/22 0913   Wound Etiology Venous 01/19/23 0947   Dressing Status New drainage noted; Old drainage noted 01/19/23 0947   Wound Cleansed Soap and water 01/19/23 0947   Dressing/Treatment Other (comment) 12/28/22 0959   Wound Length (cm) 0.1 cm 01/19/23 0947   Wound Width (cm) 0.1 cm 01/19/23 0947   Wound Depth (cm) 0.1 cm 01/19/23 0947   Wound Surface Area (cm^2) 0.01 cm^2 01/19/23 0947   Change in Wound Size % (l*w) 99.89 01/19/23 0947   Wound Volume (cm^3) 0.001 cm^3 01/19/23 0947   Wound Healing % 100 01/19/23 0947   Post-Procedure Length (cm) 0 cm 01/19/23 0947   Post-Procedure Width (cm) 0 cm 01/19/23 0947   Post-Procedure Depth (cm) 0 cm 01/19/23 0947   Post-Procedure Surface Area (cm^2) 0 cm^2 01/19/23 0947   Post-Procedure Volume (cm^3) 0 cm^3 01/19/23 0947   Wound Assessment Pink/red 01/19/23 0947   Drainage Amount None 01/19/23 0947   Drainage Description Serosanguinous; Yellow 01/12/23 0927   Odor None 01/19/23 0947   Tamara-wound Assessment Blanchable erythema;Fragile 01/19/23 0947   Margins Undefined edges 01/19/23 0947   Wound Thickness Description not for Pressure Injury Full thickness 01/19/23 0947   Number of days: 22            Assessment:      Active Hospital Problems    Diagnosis Date Noted    Localized edema [R60.0] 01/12/2023     Priority: Medium    Right foot ulcer, with fat layer exposed [facility]) [L97.512] 01/05/2023     Priority: Medium    Venous insufficiency [I87.2] 12/28/2022     Priority: Medium       Plan:     Treatment Note please see attached Discharge Instructions    Discussed the importance of compression therapy for healing of a leg ulceration in the setting of venous insufficiency and lymphedema. Discussed the importance of continued compression therapy for prevention of re-ulceration once healed. Continue use of juxta lite wraps daily. Instructed on application today. Monitor closely for ulceration and call if present. RTC as needed      Written patient discharge instructions given to patient and signed by patient or POA.       Orders Placed This Encounter   Medications    lidocaine (XYLOCAINE) 4 % external solution     Orders Placed This Encounter   Procedures    Initiate Outpatient Wound Care Protocol     Cleanse wound with saline    If wound contains bioburden or contamination cleanse with wound cleanser or antimicrobial solution     For normal periwound tissue without irritation nor maceration, apply topical skin protectant    For periwound tissue with irritation and/or maceration, apply zinc based product, topical steroid cream/ointment, or equivalent     For wounds with dry firm black eschar and/or without exudate, apply betadine and leave open to air      For wounds with scant/small to no exudate or drainage, apply wound gel, hydrocolloid, polymer, or equivalent and cover with secondary dressing/foam      For wounds with moderate/large exudate or drainage, apply alginate, hydrofiber, polymer, or equivalent and cover with secondary dressing/foam    For wounds with nonviable tissue requiring removal, apply chemical or mechanical debrider and cover with secondary dressing/foam    For wounds with tunneling, dead space, or cavity, fill or pack with strip/gauze/kerlex to fit and cover with secondary dressing/foam    For wounds with adequate granulation or epithelization, apply wound gel, hydrocolloid, polymer, collagen, or transparent film, and cover secondary dry dressing/foam    For wounds that need additional secondary dressing to help pad or control additional drainage/exudates, add foam, absorbent pad or hydrocolloid    For wounds with suspected or known infection, apply antimicrobial mesh and/or antimicrobial alginate/hydrofiber, or antimicrobial solution moistened gauze/kerlex, or equivalent and cover with secondary dressing/foam    Compression Management needed for edema control, apply multilayer compression or tubular garment or equivalent    Offloading Management needed for pressure relief, apply offloading shoe/boot or equivalent     Standing Status:   Standing     Number of Occurrences:   1          Discharge Instructions             [address] [facility]                            Visit  Discharge Instructions / Physician Orders  DATE: 1/19/23     Home Care: none     SUPPLIES ORDERED THRU:      [employer]               DATE LAST SUPPLIED 12/28/22  Velcro compression stockings ordered 1/5/22 from [employer]  If you have not heard from the dressing supply company within 2 -3 days of visit please call company to check on the status of your order. [employer]- [phone]        Wound Location:  right dorsal foot     Cleanse with: Liquid antibacterial soap and water, rinse well      Dressing Orders:    gentac  to protect new skin                    secure with   juxtalite wraps, set to 30-40 mmhg    x 30days     Frequency:  once daily     Additional Orders: Increase protein to diet (meat, cheese, eggs, fish, peanut butter, nuts and beans)  Multivitamin daily     OFFLOADING [] YES  TYPE:                  [x] NA     Weekly wound care visits until determined otherwise. Antibiotic therapy-wound care related YES [] NO [] NA[]     MY CHART []     Smart Device  []            Your next appointment with the [address] [place] [address] is AS NEEDED                                                                                                   (Please note your next appointment above and if you are unable to keep, kindly give a 24 hour notice.  Thank you.)  If more than 15 min late we cannot guarantee you will be seen due to clinician schedule  Per Policy, Excessive cancellation will call for dismissal from program.  If you experience any of the following, please call the [address] [place] [address] during business hours:  [phone]     * Increase in Pain  * Temperature over 101  * Increase in drainage from your wound  * Drainage with a foul odor  * Bleeding  * Increase in swelling  * Need for compression bandage changes due to slippage, breakthrough drainage. If you need medical attention outside of the business hours of the [address] [place] [address] Road please contact your PCP or go to the nearest emergency room. The information contained in the After Visit Summary has been reviewed with me, the patient and/or responsible adult, by my health care provider(s). I had the opportunity to ask questions regarding this information.  I have elected to receive;      []After Visit Summary  [x]Comprehensive Discharge Instruction        Patient signature______________________________________Date:________  Electronically signed by Viviana Mcbride RN on 1/19/2023 at 10:07 AM   Electronically signed by Elyse Solitario DPM on 1/19/2023 at 9:29 AM        Electronically signed by Elyse Solitario DPM on 1/19/2023 at 10:09 AM

## 2023-02-13 DIAGNOSIS — I10 ESSENTIAL HYPERTENSION: Chronic | ICD-10-CM

## 2023-02-13 RX ORDER — LISINOPRIL 20 MG/1
TABLET ORAL
Qty: 180 TABLET | Refills: 0 | Status: SHIPPED | OUTPATIENT
Start: 2023-02-13

## 2023-02-21 RX ORDER — CHLORTHALIDONE 25 MG/1
TABLET ORAL
Qty: 90 TABLET | Refills: 0 | Status: SHIPPED | OUTPATIENT
Start: 2023-02-21

## 2023-03-20 RX ORDER — ATORVASTATIN CALCIUM 10 MG/1
TABLET, FILM COATED ORAL
Qty: 90 TABLET | Refills: 0 | Status: SHIPPED | OUTPATIENT
Start: 2023-03-20

## 2023-05-10 DIAGNOSIS — I10 ESSENTIAL HYPERTENSION: Chronic | ICD-10-CM

## 2023-05-10 RX ORDER — LISINOPRIL 20 MG/1
TABLET ORAL
Qty: 180 TABLET | Refills: 3 | Status: SHIPPED | OUTPATIENT
Start: 2023-05-10

## 2023-05-12 ENCOUNTER — HOSPITAL ENCOUNTER (OUTPATIENT)
Age: 83
Setting detail: SPECIMEN
Discharge: HOME | End: 2023-05-12

## 2023-05-12 DIAGNOSIS — E78.5 HYPERLIPIDEMIA, UNSPECIFIED HYPERLIPIDEMIA TYPE: ICD-10-CM

## 2023-05-12 DIAGNOSIS — R73.09 ELEVATED GLUCOSE: ICD-10-CM

## 2023-05-12 DIAGNOSIS — R53.83 OTHER FATIGUE: ICD-10-CM

## 2023-05-12 LAB
ALBUMIN SERPL-MCNC: 4 G/DL (ref 3.5–5.2)
ALBUMIN/GLOBULIN RATIO: 1.5 (ref 1–2.5)
ALP SERPL-CCNC: 68 U/L (ref 40–129)
ALT SERPL-CCNC: 14 U/L (ref 5–41)
ANION GAP SERPL CALCULATED.3IONS-SCNC: 10 MMOL/L (ref 9–17)
AST SERPL-CCNC: 20 U/L
BILIRUB SERPL-MCNC: 1.1 MG/DL (ref 0.3–1.2)
BUN SERPL-MCNC: 26 MG/DL (ref 8–23)
CALCIUM SERPL-MCNC: 9.5 MG/DL (ref 8.6–10.4)
CHLORIDE SERPL-SCNC: 106 MMOL/L (ref 98–107)
CHOLEST SERPL-MCNC: 155 MG/DL
CHOLESTEROL/HDL RATIO: 2.8
CO2 SERPL-SCNC: 26 MMOL/L (ref 20–31)
CREAT SERPL-MCNC: 1.23 MG/DL (ref 0.7–1.2)
GFR SERPL CREATININE-BSD FRML MDRD: 58 ML/MIN/1.73M2
GLUCOSE SERPL-MCNC: 87 MG/DL (ref 70–99)
HCT VFR BLD AUTO: 48.8 % (ref 40.7–50.3)
HDLC SERPL-MCNC: 56 MG/DL
HGB BLD-MCNC: 15.4 G/DL (ref 13–17)
LDLC SERPL CALC-MCNC: 86 MG/DL (ref 0–130)
MCH RBC QN AUTO: 31.2 PG (ref 25.2–33.5)
MCHC RBC AUTO-ENTMCNC: 31.6 G/DL (ref 28.4–34.8)
MCV RBC AUTO: 99 FL (ref 82.6–102.9)
NRBC AUTOMATED: 0 PER 100 WBC
PDW BLD-RTO: 12.9 % (ref 11.8–14.4)
PLATELET # BLD AUTO: NORMAL K/UL (ref 138–453)
PLATELET, FLUORESCENCE: 134 K/UL (ref 138–453)
PLATELET, IMMATURE FRACTION: 5.2 % (ref 1.1–10.3)
POTASSIUM SERPL-SCNC: 4.8 MMOL/L (ref 3.7–5.3)
PROT SERPL-MCNC: 6.7 G/DL (ref 6.4–8.3)
RBC # BLD: 4.93 M/UL (ref 4.21–5.77)
SODIUM SERPL-SCNC: 142 MMOL/L (ref 135–144)
TRIGL SERPL-MCNC: 67 MG/DL
TSH SERPL-ACNC: 2.71 UIU/ML (ref 0.3–5)
WBC # BLD AUTO: 6 K/UL (ref 3.5–11.3)

## 2023-05-15 ENCOUNTER — OFFICE VISIT (OUTPATIENT)
Dept: PRIMARY CARE CLINIC | Age: 83
End: 2023-05-15
Payer: MEDICARE

## 2023-05-15 VITALS
OXYGEN SATURATION: 96 % | SYSTOLIC BLOOD PRESSURE: 134 MMHG | DIASTOLIC BLOOD PRESSURE: 82 MMHG | HEART RATE: 61 BPM | WEIGHT: 211 LBS | BODY MASS INDEX: 30.28 KG/M2

## 2023-05-15 DIAGNOSIS — I10 ESSENTIAL HYPERTENSION: Primary | ICD-10-CM

## 2023-05-15 DIAGNOSIS — R60.0 LOCALIZED EDEMA: ICD-10-CM

## 2023-05-15 PROCEDURE — G8427 DOCREV CUR MEDS BY ELIG CLIN: HCPCS | Performed by: NURSE PRACTITIONER

## 2023-05-15 PROCEDURE — G8417 CALC BMI ABV UP PARAM F/U: HCPCS | Performed by: NURSE PRACTITIONER

## 2023-05-15 PROCEDURE — 3075F SYST BP GE 130 - 139MM HG: CPT | Performed by: NURSE PRACTITIONER

## 2023-05-15 PROCEDURE — 1036F TOBACCO NON-USER: CPT | Performed by: NURSE PRACTITIONER

## 2023-05-15 PROCEDURE — 99214 OFFICE O/P EST MOD 30 MIN: CPT | Performed by: NURSE PRACTITIONER

## 2023-05-15 PROCEDURE — 1123F ACP DISCUSS/DSCN MKR DOCD: CPT | Performed by: NURSE PRACTITIONER

## 2023-05-15 PROCEDURE — 3079F DIAST BP 80-89 MM HG: CPT | Performed by: NURSE PRACTITIONER

## 2023-05-15 SDOH — ECONOMIC STABILITY: INCOME INSECURITY: HOW HARD IS IT FOR YOU TO PAY FOR THE VERY BASICS LIKE FOOD, HOUSING, MEDICAL CARE, AND HEATING?: NOT HARD AT ALL

## 2023-05-15 SDOH — ECONOMIC STABILITY: FOOD INSECURITY: WITHIN THE PAST 12 MONTHS, THE FOOD YOU BOUGHT JUST DIDN'T LAST AND YOU DIDN'T HAVE MONEY TO GET MORE.: NEVER TRUE

## 2023-05-15 SDOH — ECONOMIC STABILITY: HOUSING INSECURITY
IN THE LAST 12 MONTHS, WAS THERE A TIME WHEN YOU DID NOT HAVE A STEADY PLACE TO SLEEP OR SLEPT IN A SHELTER (INCLUDING NOW)?: NO

## 2023-05-15 SDOH — ECONOMIC STABILITY: FOOD INSECURITY: WITHIN THE PAST 12 MONTHS, YOU WORRIED THAT YOUR FOOD WOULD RUN OUT BEFORE YOU GOT MONEY TO BUY MORE.: NEVER TRUE

## 2023-05-15 ASSESSMENT — PATIENT HEALTH QUESTIONNAIRE - PHQ9
SUM OF ALL RESPONSES TO PHQ QUESTIONS 1-9: 0
2. FEELING DOWN, DEPRESSED OR HOPELESS: 0
SUM OF ALL RESPONSES TO PHQ QUESTIONS 1-9: 0
1. LITTLE INTEREST OR PLEASURE IN DOING THINGS: 0
SUM OF ALL RESPONSES TO PHQ QUESTIONS 1-9: 0
SUM OF ALL RESPONSES TO PHQ9 QUESTIONS 1 & 2: 0
SUM OF ALL RESPONSES TO PHQ QUESTIONS 1-9: 0

## 2023-05-15 ASSESSMENT — ENCOUNTER SYMPTOMS
SHORTNESS OF BREATH: 0
ABDOMINAL PAIN: 0
BACK PAIN: 0
COUGH: 0

## 2023-05-15 NOTE — PROGRESS NOTES
Physical Exam  Vitals and nursing note reviewed. Constitutional:       Appearance: He is well-developed. HENT:      Head: Normocephalic and atraumatic. Eyes:      Pupils: Pupils are equal, round, and reactive to light. Cardiovascular:      Rate and Rhythm: Normal rate and regular rhythm. Heart sounds: Normal heart sounds. Pulmonary:      Effort: Pulmonary effort is normal.      Breath sounds: Normal breath sounds. Abdominal:      General: Bowel sounds are normal.      Palpations: Abdomen is soft. Tenderness: There is no abdominal tenderness. Musculoskeletal:         General: Normal range of motion. Cervical back: Normal range of motion. Skin:     General: Skin is warm and dry. Neurological:      Mental Status: He is alert and oriented to person, place, and time. Psychiatric:         Behavior: Behavior normal.         Thought Content: Thought content normal.         Judgment: Judgment normal.     /82   Pulse 61   Wt 211 lb (95.7 kg)   SpO2 96%   BMI 30.28 kg/m²     Assessment:       Diagnosis Orders   1. Essential hypertension  Basic Metabolic Panel      2. Localized edema  Basic Metabolic Panel                Plan:      Return in about 6 months (around 11/15/2023) for AWV. Chronic conditions- Stable. Continue diet/exercise. Compression stockings and elevate legs. Update BMP hydrated. Follow up in six months for AWV/earlier if needed. Orders Placed This Encounter   Procedures    Basic Metabolic Panel     Standing Status:   Future     Standing Expiration Date:   5/15/2024          Patient given educational materials - see patient instructions. Discussed use, benefit, and side effects of prescribed medications. All patientquestions answered. Pt voiced understanding. Reviewed health maintenance. Instructedto continue current medications, diet and exercise. Patient agreed with treatmentplan. Follow up as directed.      Electronicallysigned by Vamshi Munguia

## 2023-05-22 ENCOUNTER — HOSPITAL ENCOUNTER (OUTPATIENT)
Age: 83
Setting detail: SPECIMEN
Discharge: HOME | End: 2023-05-22

## 2023-05-22 DIAGNOSIS — I10 ESSENTIAL HYPERTENSION: ICD-10-CM

## 2023-05-22 DIAGNOSIS — R60.0 LOCALIZED EDEMA: ICD-10-CM

## 2023-05-22 LAB
ANION GAP SERPL CALCULATED.3IONS-SCNC: 13 MMOL/L (ref 9–17)
BUN SERPL-MCNC: 27 MG/DL (ref 8–23)
CALCIUM SERPL-MCNC: 9.1 MG/DL (ref 8.6–10.4)
CHLORIDE SERPL-SCNC: 104 MMOL/L (ref 98–107)
CO2 SERPL-SCNC: 24 MMOL/L (ref 20–31)
CREAT SERPL-MCNC: 1.18 MG/DL (ref 0.7–1.2)
GFR SERPL CREATININE-BSD FRML MDRD: >60 ML/MIN/1.73M2
GLUCOSE SERPL-MCNC: 101 MG/DL (ref 70–99)
POTASSIUM SERPL-SCNC: 4.5 MMOL/L (ref 3.7–5.3)
SODIUM SERPL-SCNC: 141 MMOL/L (ref 135–144)

## 2023-05-22 RX ORDER — CHLORTHALIDONE 25 MG/1
TABLET ORAL
Qty: 90 TABLET | Refills: 0 | Status: SHIPPED | OUTPATIENT
Start: 2023-05-22

## 2023-07-10 DIAGNOSIS — I10 ESSENTIAL HYPERTENSION: Chronic | ICD-10-CM

## 2023-07-10 RX ORDER — ATENOLOL 25 MG/1
TABLET ORAL
Qty: 180 TABLET | Refills: 0 | Status: SHIPPED | OUTPATIENT
Start: 2023-07-10

## 2023-08-16 RX ORDER — CHLORTHALIDONE 25 MG/1
TABLET ORAL
Qty: 90 TABLET | Refills: 0 | Status: SHIPPED | OUTPATIENT
Start: 2023-08-16

## 2023-09-08 RX ORDER — ATORVASTATIN CALCIUM 10 MG/1
TABLET, FILM COATED ORAL
Qty: 90 TABLET | Refills: 0 | Status: SHIPPED | OUTPATIENT
Start: 2023-09-08

## 2023-09-29 ENCOUNTER — TELEPHONE (OUTPATIENT)
Dept: PRIMARY CARE CLINIC | Age: 83
End: 2023-09-29

## 2023-09-29 NOTE — TELEPHONE ENCOUNTER
Patient called to inform pcp that he and his wife tested positive for covid over a week ago and is still positive. Patient stated they feel fine just wanted to inform PCP.

## 2023-10-04 DIAGNOSIS — I10 ESSENTIAL HYPERTENSION: Chronic | ICD-10-CM

## 2023-10-04 RX ORDER — ATENOLOL 25 MG/1
25 TABLET ORAL 2 TIMES DAILY
Qty: 180 TABLET | Refills: 0 | Status: SHIPPED | OUTPATIENT
Start: 2023-10-04

## 2023-11-10 RX ORDER — CHLORTHALIDONE 25 MG/1
TABLET ORAL
Qty: 90 TABLET | Refills: 0 | Status: SHIPPED | OUTPATIENT
Start: 2023-11-10

## 2023-11-14 SDOH — HEALTH STABILITY: PHYSICAL HEALTH: ON AVERAGE, HOW MANY DAYS PER WEEK DO YOU ENGAGE IN MODERATE TO STRENUOUS EXERCISE (LIKE A BRISK WALK)?: 0 DAYS

## 2023-11-14 ASSESSMENT — PATIENT HEALTH QUESTIONNAIRE - PHQ9
SUM OF ALL RESPONSES TO PHQ9 QUESTIONS 1 & 2: 0
2. FEELING DOWN, DEPRESSED OR HOPELESS: 0
SUM OF ALL RESPONSES TO PHQ QUESTIONS 1-9: 0
1. LITTLE INTEREST OR PLEASURE IN DOING THINGS: 0
SUM OF ALL RESPONSES TO PHQ QUESTIONS 1-9: 0

## 2023-11-14 ASSESSMENT — LIFESTYLE VARIABLES
HOW OFTEN DURING THE LAST YEAR HAVE YOU FOUND THAT YOU WERE NOT ABLE TO STOP DRINKING ONCE YOU HAD STARTED: NEVER
HOW MANY STANDARD DRINKS CONTAINING ALCOHOL DO YOU HAVE ON A TYPICAL DAY: 1
HAVE YOU OR SOMEONE ELSE BEEN INJURED AS A RESULT OF YOUR DRINKING: NO
HOW OFTEN DO YOU HAVE SIX OR MORE DRINKS ON ONE OCCASION: 1
HOW OFTEN DO YOU HAVE A DRINK CONTAINING ALCOHOL: 5
HOW OFTEN DURING THE LAST YEAR HAVE YOU HAD A FEELING OF GUILT OR REMORSE AFTER DRINKING: 0
HOW OFTEN DURING THE LAST YEAR HAVE YOU NEEDED AN ALCOHOLIC DRINK FIRST THING IN THE MORNING TO GET YOURSELF GOING AFTER A NIGHT OF HEAVY DRINKING: 0
HOW OFTEN DURING THE LAST YEAR HAVE YOU BEEN UNABLE TO REMEMBER WHAT HAPPENED THE NIGHT BEFORE BECAUSE YOU HAD BEEN DRINKING: NEVER
HOW OFTEN DURING THE LAST YEAR HAVE YOU BEEN UNABLE TO REMEMBER WHAT HAPPENED THE NIGHT BEFORE BECAUSE YOU HAD BEEN DRINKING: 0
HOW OFTEN DO YOU HAVE A DRINK CONTAINING ALCOHOL: 4 OR MORE TIMES A WEEK
HOW OFTEN DURING THE LAST YEAR HAVE YOU FAILED TO DO WHAT WAS NORMALLY EXPECTED FROM YOU BECAUSE OF DRINKING: NEVER
HOW OFTEN DURING THE LAST YEAR HAVE YOU FOUND THAT YOU WERE NOT ABLE TO STOP DRINKING ONCE YOU HAD STARTED: 0
HAVE YOU OR SOMEONE ELSE BEEN INJURED AS A RESULT OF YOUR DRINKING: 0
HAS A RELATIVE, FRIEND, DOCTOR, OR ANOTHER HEALTH PROFESSIONAL EXPRESSED CONCERN ABOUT YOUR DRINKING OR SUGGESTED YOU CUT DOWN: 0
HOW OFTEN DURING THE LAST YEAR HAVE YOU FAILED TO DO WHAT WAS NORMALLY EXPECTED FROM YOU BECAUSE OF DRINKING: 0
HOW OFTEN DURING THE LAST YEAR HAVE YOU NEEDED AN ALCOHOLIC DRINK FIRST THING IN THE MORNING TO GET YOURSELF GOING AFTER A NIGHT OF HEAVY DRINKING: NEVER
HOW MANY STANDARD DRINKS CONTAINING ALCOHOL DO YOU HAVE ON A TYPICAL DAY: 1 OR 2
HAS A RELATIVE, FRIEND, DOCTOR, OR ANOTHER HEALTH PROFESSIONAL EXPRESSED CONCERN ABOUT YOUR DRINKING OR SUGGESTED YOU CUT DOWN: NO
HOW OFTEN DURING THE LAST YEAR HAVE YOU HAD A FEELING OF GUILT OR REMORSE AFTER DRINKING: NEVER

## 2023-11-15 ENCOUNTER — OFFICE VISIT (OUTPATIENT)
Dept: PRIMARY CARE CLINIC | Age: 83
End: 2023-11-15
Payer: MEDICARE

## 2023-11-15 VITALS
WEIGHT: 209.8 LBS | OXYGEN SATURATION: 94 % | RESPIRATION RATE: 13 BRPM | HEIGHT: 70 IN | HEART RATE: 62 BPM | BODY MASS INDEX: 30.03 KG/M2 | DIASTOLIC BLOOD PRESSURE: 64 MMHG | SYSTOLIC BLOOD PRESSURE: 122 MMHG

## 2023-11-15 DIAGNOSIS — Z00.00 ENCOUNTER FOR GENERAL ADULT MEDICAL EXAMINATION W/O ABNORMAL FINDINGS: Primary | ICD-10-CM

## 2023-11-15 PROCEDURE — 3078F DIAST BP <80 MM HG: CPT | Performed by: NURSE PRACTITIONER

## 2023-11-15 PROCEDURE — G8484 FLU IMMUNIZE NO ADMIN: HCPCS | Performed by: NURSE PRACTITIONER

## 2023-11-15 PROCEDURE — 1123F ACP DISCUSS/DSCN MKR DOCD: CPT | Performed by: NURSE PRACTITIONER

## 2023-11-15 PROCEDURE — G0439 PPPS, SUBSEQ VISIT: HCPCS | Performed by: NURSE PRACTITIONER

## 2023-11-15 PROCEDURE — 3074F SYST BP LT 130 MM HG: CPT | Performed by: NURSE PRACTITIONER

## 2023-11-15 NOTE — PROGRESS NOTES
nasal mucosa and turbinates normal without polyps  Neck: supple and non-tender without mass, no thyromegaly or thyroid nodules, no cervical lymphadenopathy  Pulmonary/Chest: clear to auscultation bilaterally- no wheezes, rales or rhonchi, normal air movement, no respiratory distress  Cardiovascular: normal rate, regular rhythm, normal S1 and S2, no murmurs, rubs, clicks, or gallops, distal pulses intact, no carotid bruits  Abdomen: soft, non-tender, non-distended, normal bowel sounds, no masses or organomegaly  Extremities: no cyanosis, clubbing or edema  Musculoskeletal: normal range of motion, no joint swelling, deformity or tenderness  Neurologic: reflexes normal and symmetric, no cranial nerve deficit, gait, coordination and speech normal       Allergies   Allergen Reactions    Sulfa Antibiotics      Prior to Visit Medications    Medication Sig Taking?  Authorizing Provider   chlorthalidone (HYGROTON) 25 MG tablet TAKE 1 TABLET ONCE DAILY Yes BOBBY Espinosa CNP   atenolol (TENORMIN) 25 MG tablet TAKE 1 TABLET TWICE DAILY Yes Radha [place],    atorvastatin (LIPITOR) 10 MG tablet TAKE ONE TABLET BY MOUTH ONE TIME DAILY Yes BOBBY Espinosa CNP   lisinopril (PRINIVIL;ZESTRIL) 20 MG tablet TAKE TWO TABLETS BY MOUTH DAILY Yes Dee Chase, Paulino Nails, APRN - CNP   Multiple Vitamins-Minerals (THERAPEUTIC MULTIVITAMIN-MINERALS) tablet Take 1 tablet by mouth daily Yes Provider, MD Arianna Frias (Including outside providers/suppliers regularly involved in providing care):   Patient Care Team:  BOBBY Espinosa CNP as PCP - General (Nurse Practitioner)  BOBBY Espinosa CNP as PCP - Empaneled Provider     Reviewed and updated this visit:  Tobacco  Allergies  Meds  Problems  Med Hx  Surg Hx  Soc Hx  Fam Hx           Presents with wife for AWV  BP well controlled  Has lost 2lb since LOV    Cataract returning, following with eye dr. Aayush Rodriguez labs WNL  Wearing

## 2023-12-26 RX ORDER — ATORVASTATIN CALCIUM 10 MG/1
TABLET, FILM COATED ORAL
Qty: 90 TABLET | Refills: 0 | Status: SHIPPED | OUTPATIENT
Start: 2023-12-26

## 2024-02-21 RX ORDER — CHLORTHALIDONE 25 MG/1
TABLET ORAL
Qty: 90 TABLET | Refills: 0 | Status: SHIPPED | OUTPATIENT
Start: 2024-02-21

## 2024-03-08 ENCOUNTER — TELEPHONE (OUTPATIENT)
Dept: PRIMARY CARE CLINIC | Age: 84
End: 2024-03-08

## 2024-03-08 ENCOUNTER — SCHEDULED TELEPHONE ENCOUNTER (OUTPATIENT)
Dept: PRIMARY CARE CLINIC | Age: 84
End: 2024-03-08

## 2024-03-08 DIAGNOSIS — R60.0 LOCALIZED EDEMA: Primary | ICD-10-CM

## 2024-03-08 RX ORDER — FUROSEMIDE 20 MG/1
20 TABLET ORAL DAILY PRN
Qty: 7 TABLET | Refills: 0 | Status: SHIPPED | OUTPATIENT
Start: 2024-03-08

## 2024-03-08 NOTE — TELEPHONE ENCOUNTER
Called Pt and made appt  
OV note in media  
Pt stopped in the office and said he saw his podiatrist for wound on his foot and got that taken care of and said it was swollen as well and his podiatrist prescribed him Furosemide 20 MG tab and helped. Said he followed back up with them and the podiatrist said to contact you to prescribe more of the Furosemide as he doesn't do long term meds. Pt asking for referral and said they were supposed to fax records the other day. Records not in chart but going to request them. Please advise.      Furosemide 20 MG Tab \"take 1 tablet once daily for 7 days.\" [facility].      Dr. Filipe Song - ph. [phone]  
Telephone encounter to review  
1. Stage 3 [id number] injury left ischium-Cleanse wound bed w/ normal saline, gently pat dry.  Apply thin layer of Coloplast Triad hydrophilic ointment to absorb wound exudate and provide protective layer. Cover w/ dry gauze dressing and foam Allevyn dressing to keep paste in place to wound bed. Apply Triad & change dressing q12h and prn for strike-through drainage or soiling. If top layer of Triad soiled, gently remove w/ perineal cleanser and re-apply ointment. Do not scrub off as this may cause further skin breakdown. Do not apply foam Allevyn dressing directly over Triad (use gauze in between) as ointment gets absorbed into dressing as opposed to being in direct contact w/ wound bed.   -Assess skin/wound qshift, report changes to primary provider.    Additional recs:  -Continue to instruct/encourage & assist patient as needed w/ turning/positioning patient from side-to-side q2h while in bed, q1h when/if OOB chair, or in accordance w/ pt's plan of care. Utilize pillows as needed to assist w/ turning/positioning. When/if OOB chair, utilize pilows chair cushion to offload pressure.   -While in bed, encourage patient to offload heels from bed surface with soft pillow under calfs.  -Continue prophylactically applying Coloplast Raul Protect moisture barrier cream to buttock and perineal area daily and prn after any soiling.   -Continue utilizing one underpad underneath patient to contain incontinence episodes; change pad when saturated/soiled.   -If pt w/ any consistent urinary incontinence, consider utilizing female incontinence device/PrimaFit (if patient candidate) to contain urinary incontinence.  -Continue nutrition consult for optimal wound healing.   -Tight glucose control for optimal wound healing.     Plan of Care: Primary RN Julissa at bedside & aware of above. Spoke w/ covering/primary MD Johnson in regards to above. No further needs/recs from [facility] service at this time. Staff RN to perform routine skin/wound assessment and manage wound care. Questions or concerns or if wound worsens and reconsult needed, please contact [facility], [phone].

## 2024-03-08 NOTE — PROGRESS NOTES
Presents via telephone  Unable to review vitals    Has been following with podiatry for bilateral lower extremity swelling  Significant improvement noted with lasix 20mg daily x 7 days  Was told to follow up with PCP  Reviewed podiatry note and spoke with patient  Currently swelling has resolved  Reviewed I would not recommend lasix for daily use    Will give short rx for prn use if swelling returns  Patient is in agreement with plan    Denies any other problems/concerns      Documentation:  I communicated with the patient and/or health care decision maker about see above.   Details of this discussion including any medical advice provided: see above    Total Time: minutes: 11-20 minutes    Dorian Mixon was evaluated through a synchronous (real-time) audio encounter. Patient identification was verified at the start of the visit. He (or guardian if applicable) is aware that this is a billable service, which includes applicable co-pays. This visit was conducted with the patient's (and/or legal guardian's) verbal consent. He has not had a related appointment within my department in the past 7 days or scheduled within the next 24 hours.   The patient was located at Home: [address] [place] [address] [address] [place] [address].  The provider was located at Facility (Appt Dept): [address] [place] [address]   [place] [address].    Note: not billable if this call serves to triage the patient into an appointment for the relevant concern    Dorian Mixon is a 83 y.o. male evaluated via telephone on 3/8/2024 for No chief complaint on file.  .        TERRY REYNA, APRN - CNP

## 2024-03-21 RX ORDER — ATORVASTATIN CALCIUM 10 MG/1
TABLET, FILM COATED ORAL
Qty: 90 TABLET | Refills: 0 | Status: SHIPPED | OUTPATIENT
Start: 2024-03-21

## 2024-04-10 DIAGNOSIS — I10 ESSENTIAL HYPERTENSION: Chronic | ICD-10-CM

## 2024-04-10 RX ORDER — LISINOPRIL 20 MG/1
40 TABLET ORAL DAILY
Qty: 180 TABLET | Refills: 0 | Status: SHIPPED | OUTPATIENT
Start: 2024-04-10

## 2024-05-06 DIAGNOSIS — I10 ESSENTIAL HYPERTENSION: Chronic | ICD-10-CM

## 2024-05-06 RX ORDER — ATENOLOL 25 MG/1
25 TABLET ORAL 2 TIMES DAILY
Qty: 180 TABLET | Refills: 0 | Status: SHIPPED | OUTPATIENT
Start: 2024-05-06

## 2024-05-14 ASSESSMENT — PATIENT HEALTH QUESTIONNAIRE - PHQ9
2. FEELING DOWN, DEPRESSED OR HOPELESS: NOT AT ALL
SUM OF ALL RESPONSES TO PHQ9 QUESTIONS 1 & 2: 0
1. LITTLE INTEREST OR PLEASURE IN DOING THINGS: NOT AT ALL
SUM OF ALL RESPONSES TO PHQ QUESTIONS 1-9: 0
SUM OF ALL RESPONSES TO PHQ QUESTIONS 1-9: 0
1. LITTLE INTEREST OR PLEASURE IN DOING THINGS: NOT AT ALL
SUM OF ALL RESPONSES TO PHQ QUESTIONS 1-9: 0
2. FEELING DOWN, DEPRESSED OR HOPELESS: NOT AT ALL
SUM OF ALL RESPONSES TO PHQ9 QUESTIONS 1 & 2: 0
SUM OF ALL RESPONSES TO PHQ QUESTIONS 1-9: 0

## 2024-05-15 ENCOUNTER — OFFICE VISIT (OUTPATIENT)
Dept: PRIMARY CARE CLINIC | Age: 84
End: 2024-05-15
Payer: MEDICARE

## 2024-05-15 ENCOUNTER — HOSPITAL ENCOUNTER (OUTPATIENT)
Age: 84
Setting detail: SPECIMEN
Discharge: HOME | End: 2024-05-15

## 2024-05-15 VITALS
HEIGHT: 70 IN | RESPIRATION RATE: 15 BRPM | WEIGHT: 207.4 LBS | BODY MASS INDEX: 29.69 KG/M2 | OXYGEN SATURATION: 96 % | DIASTOLIC BLOOD PRESSURE: 68 MMHG | HEART RATE: 61 BPM | SYSTOLIC BLOOD PRESSURE: 124 MMHG

## 2024-05-15 DIAGNOSIS — R73.09 ELEVATED GLUCOSE: ICD-10-CM

## 2024-05-15 DIAGNOSIS — R53.83 OTHER FATIGUE: ICD-10-CM

## 2024-05-15 DIAGNOSIS — E78.5 HYPERLIPIDEMIA, UNSPECIFIED HYPERLIPIDEMIA TYPE: ICD-10-CM

## 2024-05-15 DIAGNOSIS — N18.32 STAGE 3B CHRONIC KIDNEY DISEASE (HCC): ICD-10-CM

## 2024-05-15 DIAGNOSIS — L29.9 GENERALIZED PRURITUS: ICD-10-CM

## 2024-05-15 DIAGNOSIS — D69.6 THROMBOCYTOPENIA (HCC): ICD-10-CM

## 2024-05-15 DIAGNOSIS — I82.412 DVT OF DEEP FEMORAL VEIN, LEFT (HCC): ICD-10-CM

## 2024-05-15 DIAGNOSIS — I10 ESSENTIAL HYPERTENSION: Primary | ICD-10-CM

## 2024-05-15 DIAGNOSIS — L97.512 RIGHT FOOT ULCER, WITH FAT LAYER EXPOSED (HCC): ICD-10-CM

## 2024-05-15 LAB
ALBUMIN SERPL-MCNC: 4.4 G/DL (ref 3.5–5.2)
ALBUMIN/GLOB SERPL: 2 {RATIO} (ref 1–2.5)
ALP SERPL-CCNC: 82 U/L (ref 40–129)
ALT SERPL-CCNC: 8 U/L (ref 10–50)
ANION GAP SERPL CALCULATED.3IONS-SCNC: 13 MMOL/L (ref 9–16)
AST SERPL-CCNC: 23 U/L (ref 10–50)
BILIRUB SERPL-MCNC: 0.9 MG/DL (ref 0–1.2)
BUN SERPL-MCNC: 31 MG/DL (ref 8–23)
CALCIUM SERPL-MCNC: 9.6 MG/DL (ref 8.6–10.4)
CHLORIDE SERPL-SCNC: 103 MMOL/L (ref 98–107)
CHOLEST SERPL-MCNC: 160 MG/DL (ref 0–199)
CHOLESTEROL/HDL RATIO: 3
CO2 SERPL-SCNC: 25 MMOL/L (ref 20–31)
CREAT SERPL-MCNC: 1.3 MG/DL (ref 0.7–1.2)
ERYTHROCYTE [DISTWIDTH] IN BLOOD BY AUTOMATED COUNT: 13.4 % (ref 11.8–14.4)
EST. AVERAGE GLUCOSE BLD GHB EST-MCNC: 117 MG/DL
GFR, ESTIMATED: 54 ML/MIN/1.73M2
GLUCOSE SERPL-MCNC: 81 MG/DL (ref 74–99)
HBA1C MFR BLD: 5.7 % (ref 4–6)
HCT VFR BLD AUTO: 47.5 % (ref 40.7–50.3)
HDLC SERPL-MCNC: 53 MG/DL
HGB BLD-MCNC: 15.2 G/DL (ref 13–17)
LDLC SERPL CALC-MCNC: 88 MG/DL (ref 0–100)
MCH RBC QN AUTO: 31.5 PG (ref 25.2–33.5)
MCHC RBC AUTO-ENTMCNC: 32 G/DL (ref 28.4–34.8)
MCV RBC AUTO: 98.3 FL (ref 82.6–102.9)
NRBC BLD-RTO: 0 PER 100 WBC
PLATELET # BLD AUTO: 150 K/UL (ref 138–453)
PMV BLD AUTO: 10.6 FL (ref 8.1–13.5)
POTASSIUM SERPL-SCNC: 4.7 MMOL/L (ref 3.7–5.3)
PROT SERPL-MCNC: 7.1 G/DL (ref 6.6–8.7)
RBC # BLD AUTO: 4.83 M/UL (ref 4.21–5.77)
SODIUM SERPL-SCNC: 141 MMOL/L (ref 136–145)
TRIGL SERPL-MCNC: 94 MG/DL
TSH SERPL DL<=0.05 MIU/L-ACNC: 2.12 UIU/ML (ref 0.27–4.2)
VLDLC SERPL CALC-MCNC: 19 MG/DL
WBC OTHER # BLD: 6.9 K/UL (ref 3.5–11.3)

## 2024-05-15 PROCEDURE — G8417 CALC BMI ABV UP PARAM F/U: HCPCS | Performed by: NURSE PRACTITIONER

## 2024-05-15 PROCEDURE — 1036F TOBACCO NON-USER: CPT | Performed by: NURSE PRACTITIONER

## 2024-05-15 PROCEDURE — 99214 OFFICE O/P EST MOD 30 MIN: CPT | Performed by: NURSE PRACTITIONER

## 2024-05-15 PROCEDURE — 3078F DIAST BP <80 MM HG: CPT | Performed by: NURSE PRACTITIONER

## 2024-05-15 PROCEDURE — G8427 DOCREV CUR MEDS BY ELIG CLIN: HCPCS | Performed by: NURSE PRACTITIONER

## 2024-05-15 PROCEDURE — 1123F ACP DISCUSS/DSCN MKR DOCD: CPT | Performed by: NURSE PRACTITIONER

## 2024-05-15 PROCEDURE — 3074F SYST BP LT 130 MM HG: CPT | Performed by: NURSE PRACTITIONER

## 2024-05-15 PROCEDURE — G2211 COMPLEX E/M VISIT ADD ON: HCPCS | Performed by: NURSE PRACTITIONER

## 2024-05-15 RX ORDER — LORATADINE 10 MG/1
10 TABLET ORAL DAILY
Qty: 90 TABLET | Refills: 3 | Status: SHIPPED | OUTPATIENT
Start: 2024-05-15

## 2024-05-15 SDOH — ECONOMIC STABILITY: INCOME INSECURITY: HOW HARD IS IT FOR YOU TO PAY FOR THE VERY BASICS LIKE FOOD, HOUSING, MEDICAL CARE, AND HEATING?: NOT HARD AT ALL

## 2024-05-15 SDOH — ECONOMIC STABILITY: FOOD INSECURITY: WITHIN THE PAST 12 MONTHS, YOU WORRIED THAT YOUR FOOD WOULD RUN OUT BEFORE YOU GOT MONEY TO BUY MORE.: NEVER TRUE

## 2024-05-15 SDOH — ECONOMIC STABILITY: FOOD INSECURITY: WITHIN THE PAST 12 MONTHS, THE FOOD YOU BOUGHT JUST DIDN'T LAST AND YOU DIDN'T HAVE MONEY TO GET MORE.: NEVER TRUE

## 2024-05-15 ASSESSMENT — ENCOUNTER SYMPTOMS
COUGH: 0
BACK PAIN: 0
ROS SKIN COMMENTS: ITCHING
SHORTNESS OF BREATH: 0

## 2024-05-15 NOTE — PROGRESS NOTES
[facility]  [facility]  [address] [place] [address] [address]  [address]  [facility] [address]  Dept: [phone]  Dept Fax: [phone]    Dorian Mixon is a 83 y.o. male who presentstoday for his medical conditions/complaints as noted below.  Dorian Mixon is c/o of  Chief Complaint   Patient presents with    6 Month Follow-Up         HPI:     Presents with wife for 6 month recheck on chronic conditions  BP well controlled  Has lost 2lb since LOV    Has not had any further swelling since telephone call in March  Has short dose lasix rx if needed    Willing to update annual labs    C/o generalized skin itching  Using lotion without improvement  Will trial claritin    Denies any other problems/concerns        No results found for: \"LABA1C\"          ( goal A1C is < 7)   No components found for: \"LABMICR\"  No components found for: \"LDLCHOLESTEROL\", \"LDLCALC\"    (goal LDL is <100)   AST (U/L)   Date Value   2023 20     ALT (U/L)   Date Value   2023 14     BUN (mg/dL)   Date Value   2023 27 (H)     BP Readings from Last 3 Encounters:   05/15/24 124/68   11/15/23 122/64   05/15/23 134/82          ([id number]/80)    Past Medical History:   Diagnosis Date    Cancer ([facility])     basal cell    Diverticulosis     coli    Plantar fasciitis       Past Surgical History:   Procedure Laterality Date    CATARACT REMOVAL WITH IMPLANT Bilateral may 2013    COLONOSCOPY      HERNIA REPAIR      SKIN CANCER EXCISION      multi sites    VEIN SURGERY         History reviewed. No pertinent family history.       Social History     Tobacco Use    Smoking status: Former     Current packs/day: 0.00     Average packs/day: 1 pack/day for 22.0 years (22.0 ttl pk-yrs)     Types: Cigarettes     Start date: 1959     Quit date: 1981     Years since quittin.3    Smokeless tobacco: Never    Tobacco comments:     QUIT SMOKING  - HAVE NOT SMOKED SINCE   Substance Use Topics    Alcohol use: Yes

## 2024-05-18 RX ORDER — CHLORTHALIDONE 25 MG/1
TABLET ORAL
Qty: 90 TABLET | Refills: 0 | Status: SHIPPED | OUTPATIENT
Start: 2024-05-18

## 2024-05-18 NOTE — TELEPHONE ENCOUNTER
Last Visit Date: 5/15/2024   Next Visit Date: 11/18/2024   Pharmacy:   [id number] [place] [facility] [address] [phone] - F [phone]  [address] [facility] [address]  Phone: [phone] Fax: [phone]

## 2024-06-16 RX ORDER — ATORVASTATIN CALCIUM 10 MG/1
TABLET, FILM COATED ORAL
Qty: 90 TABLET | Refills: 0 | Status: SHIPPED | OUTPATIENT
Start: 2024-06-16

## 2024-07-06 DIAGNOSIS — I10 ESSENTIAL HYPERTENSION: Chronic | ICD-10-CM

## 2024-07-06 RX ORDER — LISINOPRIL 20 MG/1
40 TABLET ORAL DAILY
Qty: 180 TABLET | Refills: 0 | Status: SHIPPED | OUTPATIENT
Start: 2024-07-06

## 2024-07-29 DIAGNOSIS — I10 ESSENTIAL HYPERTENSION: Chronic | ICD-10-CM

## 2024-07-29 RX ORDER — ATENOLOL 25 MG/1
25 TABLET ORAL 2 TIMES DAILY
Qty: 180 TABLET | Refills: 0 | Status: SHIPPED | OUTPATIENT
Start: 2024-07-29

## 2024-08-13 RX ORDER — CHLORTHALIDONE 25 MG/1
TABLET ORAL
Qty: 90 TABLET | Refills: 0 | Status: SHIPPED | OUTPATIENT
Start: 2024-08-13

## 2024-09-10 RX ORDER — ATORVASTATIN CALCIUM 10 MG/1
TABLET, FILM COATED ORAL
Qty: 90 TABLET | Refills: 0 | Status: SHIPPED | OUTPATIENT
Start: 2024-09-10

## 2024-10-01 DIAGNOSIS — I10 ESSENTIAL HYPERTENSION: Chronic | ICD-10-CM

## 2024-10-01 RX ORDER — LISINOPRIL 20 MG/1
40 TABLET ORAL DAILY
Qty: 180 TABLET | Refills: 0 | Status: SHIPPED | OUTPATIENT
Start: 2024-10-01

## 2024-10-23 DIAGNOSIS — I10 ESSENTIAL HYPERTENSION: Chronic | ICD-10-CM

## 2024-10-23 RX ORDER — ATENOLOL 25 MG/1
25 TABLET ORAL 2 TIMES DAILY
Qty: 180 TABLET | Refills: 0 | Status: SHIPPED | OUTPATIENT
Start: 2024-10-23

## 2024-11-07 RX ORDER — CHLORTHALIDONE 25 MG/1
TABLET ORAL
Qty: 90 TABLET | Refills: 0 | Status: SHIPPED | OUTPATIENT
Start: 2024-11-07

## 2024-11-18 ENCOUNTER — OFFICE VISIT (OUTPATIENT)
Dept: PRIMARY CARE CLINIC | Age: 84
End: 2024-11-18
Payer: MEDICARE

## 2024-11-18 VITALS
HEIGHT: 70 IN | DIASTOLIC BLOOD PRESSURE: 68 MMHG | SYSTOLIC BLOOD PRESSURE: 124 MMHG | OXYGEN SATURATION: 96 % | RESPIRATION RATE: 14 BRPM | WEIGHT: 208.4 LBS | BODY MASS INDEX: 29.84 KG/M2 | HEART RATE: 62 BPM

## 2024-11-18 DIAGNOSIS — N18.32 STAGE 3B CHRONIC KIDNEY DISEASE (HCC): ICD-10-CM

## 2024-11-18 DIAGNOSIS — I10 ESSENTIAL HYPERTENSION: ICD-10-CM

## 2024-11-18 DIAGNOSIS — Z00.00 ENCOUNTER FOR GENERAL ADULT MEDICAL EXAMINATION W/O ABNORMAL FINDINGS: Primary | ICD-10-CM

## 2024-11-18 PROCEDURE — 3078F DIAST BP <80 MM HG: CPT | Performed by: NURSE PRACTITIONER

## 2024-11-18 PROCEDURE — 1159F MED LIST DOCD IN RCRD: CPT | Performed by: NURSE PRACTITIONER

## 2024-11-18 PROCEDURE — G8484 FLU IMMUNIZE NO ADMIN: HCPCS | Performed by: NURSE PRACTITIONER

## 2024-11-18 PROCEDURE — G0439 PPPS, SUBSEQ VISIT: HCPCS | Performed by: NURSE PRACTITIONER

## 2024-11-18 PROCEDURE — 1160F RVW MEDS BY RX/DR IN RCRD: CPT | Performed by: NURSE PRACTITIONER

## 2024-11-18 PROCEDURE — 3074F SYST BP LT 130 MM HG: CPT | Performed by: NURSE PRACTITIONER

## 2024-11-18 PROCEDURE — 1123F ACP DISCUSS/DSCN MKR DOCD: CPT | Performed by: NURSE PRACTITIONER

## 2024-11-18 SDOH — ECONOMIC STABILITY: FOOD INSECURITY: WITHIN THE PAST 12 MONTHS, THE FOOD YOU BOUGHT JUST DIDN'T LAST AND YOU DIDN'T HAVE MONEY TO GET MORE.: NEVER TRUE

## 2024-11-18 SDOH — ECONOMIC STABILITY: INCOME INSECURITY: HOW HARD IS IT FOR YOU TO PAY FOR THE VERY BASICS LIKE FOOD, HOUSING, MEDICAL CARE, AND HEATING?: NOT HARD AT ALL

## 2024-11-18 SDOH — ECONOMIC STABILITY: FOOD INSECURITY: WITHIN THE PAST 12 MONTHS, YOU WORRIED THAT YOUR FOOD WOULD RUN OUT BEFORE YOU GOT MONEY TO BUY MORE.: NEVER TRUE

## 2024-11-18 SDOH — HEALTH STABILITY: PHYSICAL HEALTH: ON AVERAGE, HOW MANY DAYS PER WEEK DO YOU ENGAGE IN MODERATE TO STRENUOUS EXERCISE (LIKE A BRISK WALK)?: 0 DAYS

## 2024-11-18 ASSESSMENT — LIFESTYLE VARIABLES
HOW OFTEN DURING THE LAST YEAR HAVE YOU FOUND THAT YOU WERE NOT ABLE TO STOP DRINKING ONCE YOU HAD STARTED: NEVER
HOW MANY STANDARD DRINKS CONTAINING ALCOHOL DO YOU HAVE ON A TYPICAL DAY: 1
HOW OFTEN DO YOU HAVE SIX OR MORE DRINKS ON ONE OCCASION: 1
HAS A RELATIVE, FRIEND, DOCTOR, OR ANOTHER HEALTH PROFESSIONAL EXPRESSED CONCERN ABOUT YOUR DRINKING OR SUGGESTED YOU CUT DOWN: NO
HOW OFTEN DURING THE LAST YEAR HAVE YOU BEEN UNABLE TO REMEMBER WHAT HAPPENED THE NIGHT BEFORE BECAUSE YOU HAD BEEN DRINKING: NEVER
HOW OFTEN DURING THE LAST YEAR HAVE YOU FAILED TO DO WHAT WAS NORMALLY EXPECTED FROM YOU BECAUSE OF DRINKING: NEVER
HAVE YOU OR SOMEONE ELSE BEEN INJURED AS A RESULT OF YOUR DRINKING: NO
HOW OFTEN DURING THE LAST YEAR HAVE YOU NEEDED AN ALCOHOLIC DRINK FIRST THING IN THE MORNING TO GET YOURSELF GOING AFTER A NIGHT OF HEAVY DRINKING: NEVER
HOW OFTEN DURING THE LAST YEAR HAVE YOU BEEN UNABLE TO REMEMBER WHAT HAPPENED THE NIGHT BEFORE BECAUSE YOU HAD BEEN DRINKING: NEVER
HOW OFTEN DO YOU HAVE A DRINK CONTAINING ALCOHOL: 5
HOW OFTEN DURING THE LAST YEAR HAVE YOU HAD A FEELING OF GUILT OR REMORSE AFTER DRINKING: NEVER
HOW OFTEN DURING THE LAST YEAR HAVE YOU HAD A FEELING OF GUILT OR REMORSE AFTER DRINKING: NEVER
HOW OFTEN DO YOU HAVE A DRINK CONTAINING ALCOHOL: 4 OR MORE TIMES A WEEK
HOW OFTEN DURING THE LAST YEAR HAVE YOU NEEDED AN ALCOHOLIC DRINK FIRST THING IN THE MORNING TO GET YOURSELF GOING AFTER A NIGHT OF HEAVY DRINKING: NEVER
HOW OFTEN DURING THE LAST YEAR HAVE YOU FOUND THAT YOU WERE NOT ABLE TO STOP DRINKING ONCE YOU HAD STARTED: NEVER
HOW MANY STANDARD DRINKS CONTAINING ALCOHOL DO YOU HAVE ON A TYPICAL DAY: 1 OR 2
HAVE YOU OR SOMEONE ELSE BEEN INJURED AS A RESULT OF YOUR DRINKING: NO
HOW OFTEN DURING THE LAST YEAR HAVE YOU FAILED TO DO WHAT WAS NORMALLY EXPECTED FROM YOU BECAUSE OF DRINKING: NEVER
HAS A RELATIVE, FRIEND, DOCTOR, OR ANOTHER HEALTH PROFESSIONAL EXPRESSED CONCERN ABOUT YOUR DRINKING OR SUGGESTED YOU CUT DOWN: NO

## 2024-11-18 ASSESSMENT — PATIENT HEALTH QUESTIONNAIRE - PHQ9
SUM OF ALL RESPONSES TO PHQ9 QUESTIONS 1 & 2: 0
2. FEELING DOWN, DEPRESSED OR HOPELESS: NOT AT ALL
SUM OF ALL RESPONSES TO PHQ QUESTIONS 1-9: 0
1. LITTLE INTEREST OR PLEASURE IN DOING THINGS: NOT AT ALL
SUM OF ALL RESPONSES TO PHQ QUESTIONS 1-9: 0

## 2024-11-18 NOTE — PROGRESS NOTES
bilaterally- no wheezes, rales or rhonchi, normal air movement, no respiratory distress  Cardiovascular: normal rate, regular rhythm, normal S1 and S2, no murmurs, rubs, clicks, or gallops, distal pulses intact, no carotid bruits  Abdomen: soft, non-tender, non-distended, normal bowel sounds, no masses or organomegaly  Extremities: no cyanosis, clubbing or edema  Musculoskeletal: normal range of motion, no joint swelling, deformity or tenderness  Neurologic: reflexes normal and symmetric, no cranial nerve deficit, gait, coordination and speech normal            Allergies   Allergen Reactions    Sulfa Antibiotics      Prior to Visit Medications    Medication Sig Taking? Authorizing Provider   chlorthalidone (HYGROTON) 25 MG tablet TAKE ONE TABLET BY MOUTH ONE TIME DAILY Yes Anitha Dobson APRN - CNP   atenolol (TENORMIN) 25 MG tablet TAKE ONE TABLET BY MOUTH TWICE DAILY Yes Anitha Dobson APRN - CNP   lisinopril (PRINIVIL;ZESTRIL) 20 MG tablet TAKE TWO TABLETS BY MOUTH DAILY Yes Anitha Dobson APRN - CNP   atorvastatin (LIPITOR) 10 MG tablet TAKE ONE TABLET BY MOUTH ONE TIME DAILY Yes Anitha Dobson APRN - CNP   Multiple Vitamins-Minerals (THERAPEUTIC MULTIVITAMIN-MINERALS) tablet Take 1 tablet by mouth daily Yes Provider, MD Arianna Frias (Including outside providers/suppliers regularly involved in providing care):   Patient Care Team:  Anitha Dobosn APRN - CNP as PCP - General (Nurse Practitioner)  Anitha Dobson APRN - CNP as PCP - Empaneled Provider      Reviewed and updated this visit:  Tobacco  Allergies  Meds  Problems  Med Hx  Surg Hx  Soc Hx  Fam Hx            Presents with wife for 6 month recheck  BP well controlled  Weight is stable    Follows with Dr. Duke, recent CBC stable  Annual labs up to date    Following with Dr. Rossi- has appt in January    Wearing compression stockings    Denies any other problems/concerns  Follow up in six months for

## 2024-12-05 RX ORDER — ATORVASTATIN CALCIUM 10 MG/1
TABLET, FILM COATED ORAL
Qty: 90 TABLET | Refills: 0 | Status: SHIPPED | OUTPATIENT
Start: 2024-12-05

## 2024-12-26 DIAGNOSIS — I10 ESSENTIAL HYPERTENSION: Chronic | ICD-10-CM

## 2024-12-26 RX ORDER — LISINOPRIL 20 MG/1
40 TABLET ORAL DAILY
Qty: 180 TABLET | Refills: 0 | Status: SHIPPED | OUTPATIENT
Start: 2024-12-26

## 2025-01-17 DIAGNOSIS — I10 ESSENTIAL HYPERTENSION: Chronic | ICD-10-CM

## 2025-01-17 RX ORDER — ATENOLOL 25 MG/1
25 TABLET ORAL 2 TIMES DAILY
Qty: 180 TABLET | Refills: 0 | Status: SHIPPED | OUTPATIENT
Start: 2025-01-17

## 2025-01-31 RX ORDER — CHLORTHALIDONE 25 MG/1
TABLET ORAL
Qty: 90 TABLET | Refills: 0 | Status: SHIPPED | OUTPATIENT
Start: 2025-01-31

## 2025-03-01 RX ORDER — ATORVASTATIN CALCIUM 10 MG/1
TABLET, FILM COATED ORAL
Qty: 90 TABLET | Refills: 0 | Status: SHIPPED | OUTPATIENT
Start: 2025-03-01

## 2025-03-22 DIAGNOSIS — I10 ESSENTIAL HYPERTENSION: Chronic | ICD-10-CM

## 2025-03-22 RX ORDER — LISINOPRIL 20 MG/1
40 TABLET ORAL DAILY
Qty: 180 TABLET | Refills: 0 | Status: SHIPPED | OUTPATIENT
Start: 2025-03-22

## 2025-04-05 RX ORDER — LORATADINE 10 MG/1
10 TABLET ORAL DAILY
Qty: 90 TABLET | Refills: 0 | Status: SHIPPED | OUTPATIENT
Start: 2025-04-05

## 2025-04-16 DIAGNOSIS — I10 ESSENTIAL HYPERTENSION: Chronic | ICD-10-CM

## 2025-04-16 RX ORDER — ATENOLOL 25 MG/1
25 TABLET ORAL 2 TIMES DAILY
Qty: 180 TABLET | Refills: 0 | Status: SHIPPED | OUTPATIENT
Start: 2025-04-16

## 2025-04-16 NOTE — TELEPHONE ENCOUNTER
Last Visit Date: 11/18/2024   Next Visit Date: 5/19/2025     Include Location In Plan?: Yes Detail Level: Generalized Hide Include Location In Plan Question?: No

## 2025-04-29 RX ORDER — CHLORTHALIDONE 25 MG/1
25 TABLET ORAL DAILY
Qty: 90 TABLET | Refills: 0 | Status: SHIPPED | OUTPATIENT
Start: 2025-04-29

## 2025-05-16 SDOH — ECONOMIC STABILITY: INCOME INSECURITY: IN THE LAST 12 MONTHS, WAS THERE A TIME WHEN YOU WERE NOT ABLE TO PAY THE MORTGAGE OR RENT ON TIME?: NO

## 2025-05-16 SDOH — ECONOMIC STABILITY: FOOD INSECURITY: WITHIN THE PAST 12 MONTHS, YOU WORRIED THAT YOUR FOOD WOULD RUN OUT BEFORE YOU GOT MONEY TO BUY MORE.: NEVER TRUE

## 2025-05-16 SDOH — ECONOMIC STABILITY: TRANSPORTATION INSECURITY
IN THE PAST 12 MONTHS, HAS THE LACK OF TRANSPORTATION KEPT YOU FROM MEDICAL APPOINTMENTS OR FROM GETTING MEDICATIONS?: NO

## 2025-05-16 SDOH — ECONOMIC STABILITY: FOOD INSECURITY: WITHIN THE PAST 12 MONTHS, THE FOOD YOU BOUGHT JUST DIDN'T LAST AND YOU DIDN'T HAVE MONEY TO GET MORE.: NEVER TRUE

## 2025-05-16 SDOH — ECONOMIC STABILITY: TRANSPORTATION INSECURITY
IN THE PAST 12 MONTHS, HAS LACK OF TRANSPORTATION KEPT YOU FROM MEETINGS, WORK, OR FROM GETTING THINGS NEEDED FOR DAILY LIVING?: NO

## 2025-05-16 ASSESSMENT — PATIENT HEALTH QUESTIONNAIRE - PHQ9
1. LITTLE INTEREST OR PLEASURE IN DOING THINGS: NOT AT ALL
2. FEELING DOWN, DEPRESSED OR HOPELESS: NOT AT ALL
SUM OF ALL RESPONSES TO PHQ9 QUESTIONS 1 & 2: 0
1. LITTLE INTEREST OR PLEASURE IN DOING THINGS: NOT AT ALL
SUM OF ALL RESPONSES TO PHQ QUESTIONS 1-9: 0
2. FEELING DOWN, DEPRESSED OR HOPELESS: NOT AT ALL
SUM OF ALL RESPONSES TO PHQ QUESTIONS 1-9: 0

## 2025-05-19 ENCOUNTER — OFFICE VISIT (OUTPATIENT)
Dept: PRIMARY CARE CLINIC | Age: 85
End: 2025-05-19
Payer: MEDICARE

## 2025-05-19 ENCOUNTER — HOSPITAL ENCOUNTER (OUTPATIENT)
Age: 85
Setting detail: SPECIMEN
Discharge: HOME | End: 2025-05-19

## 2025-05-19 ENCOUNTER — RESULTS FOLLOW-UP (OUTPATIENT)
Dept: PRIMARY CARE CLINIC | Age: 85
End: 2025-05-19

## 2025-05-19 VITALS
DIASTOLIC BLOOD PRESSURE: 72 MMHG | SYSTOLIC BLOOD PRESSURE: 118 MMHG | HEIGHT: 70 IN | OXYGEN SATURATION: 97 % | BODY MASS INDEX: 30.35 KG/M2 | HEART RATE: 65 BPM | WEIGHT: 212 LBS

## 2025-05-19 DIAGNOSIS — R53.83 OTHER FATIGUE: ICD-10-CM

## 2025-05-19 DIAGNOSIS — R73.09 ELEVATED GLUCOSE: ICD-10-CM

## 2025-05-19 DIAGNOSIS — Z12.5 SCREENING FOR PROSTATE CANCER: ICD-10-CM

## 2025-05-19 DIAGNOSIS — I10 ESSENTIAL HYPERTENSION: ICD-10-CM

## 2025-05-19 DIAGNOSIS — L97.512 RIGHT FOOT ULCER, WITH FAT LAYER EXPOSED (HCC): ICD-10-CM

## 2025-05-19 DIAGNOSIS — N18.32 STAGE 3B CHRONIC KIDNEY DISEASE (HCC): ICD-10-CM

## 2025-05-19 DIAGNOSIS — E78.5 HYPERLIPIDEMIA, UNSPECIFIED HYPERLIPIDEMIA TYPE: ICD-10-CM

## 2025-05-19 DIAGNOSIS — E78.5 HYPERLIPIDEMIA, UNSPECIFIED HYPERLIPIDEMIA TYPE: Primary | ICD-10-CM

## 2025-05-19 LAB
ALBUMIN SERPL-MCNC: 4.1 G/DL (ref 3.5–5.2)
ALBUMIN/GLOB SERPL: 1.8 {RATIO} (ref 1–2.5)
ALP SERPL-CCNC: 67 U/L (ref 40–129)
ALT SERPL-CCNC: 15 U/L (ref 10–50)
ANION GAP SERPL CALCULATED.3IONS-SCNC: 9 MMOL/L (ref 9–16)
AST SERPL-CCNC: 20 U/L (ref 10–50)
BILIRUB SERPL-MCNC: 0.8 MG/DL (ref 0–1.2)
BUN SERPL-MCNC: 27 MG/DL (ref 8–23)
CALCIUM SERPL-MCNC: 9.2 MG/DL (ref 8.6–10.4)
CHLORIDE SERPL-SCNC: 107 MMOL/L (ref 98–107)
CHOLEST SERPL-MCNC: 151 MG/DL (ref 0–199)
CHOLESTEROL/HDL RATIO: 2.8
CO2 SERPL-SCNC: 25 MMOL/L (ref 20–31)
CREAT SERPL-MCNC: 1.3 MG/DL (ref 0.7–1.2)
EST. AVERAGE GLUCOSE BLD GHB EST-MCNC: 120 MG/DL
GFR, ESTIMATED: 54 ML/MIN/1.73M2
GLUCOSE SERPL-MCNC: 117 MG/DL (ref 74–99)
HBA1C MFR BLD: 5.8 % (ref 4–6)
HDLC SERPL-MCNC: 53 MG/DL
LDLC SERPL CALC-MCNC: 79 MG/DL (ref 0–100)
POTASSIUM SERPL-SCNC: 4.5 MMOL/L (ref 3.7–5.3)
PROT SERPL-MCNC: 6.4 G/DL (ref 6.6–8.7)
PSA SERPL-MCNC: 4.19 NG/ML (ref 0–4)
SODIUM SERPL-SCNC: 141 MMOL/L (ref 136–145)
TRIGL SERPL-MCNC: 97 MG/DL
TSH SERPL DL<=0.05 MIU/L-ACNC: 1.99 UIU/ML (ref 0.27–4.2)
VLDLC SERPL CALC-MCNC: 19 MG/DL (ref 1–30)

## 2025-05-19 PROCEDURE — 1159F MED LIST DOCD IN RCRD: CPT | Performed by: NURSE PRACTITIONER

## 2025-05-19 PROCEDURE — G2211 COMPLEX E/M VISIT ADD ON: HCPCS | Performed by: NURSE PRACTITIONER

## 2025-05-19 PROCEDURE — 1123F ACP DISCUSS/DSCN MKR DOCD: CPT | Performed by: NURSE PRACTITIONER

## 2025-05-19 PROCEDURE — 1036F TOBACCO NON-USER: CPT | Performed by: NURSE PRACTITIONER

## 2025-05-19 PROCEDURE — 3074F SYST BP LT 130 MM HG: CPT | Performed by: NURSE PRACTITIONER

## 2025-05-19 PROCEDURE — 3078F DIAST BP <80 MM HG: CPT | Performed by: NURSE PRACTITIONER

## 2025-05-19 PROCEDURE — 99214 OFFICE O/P EST MOD 30 MIN: CPT | Performed by: NURSE PRACTITIONER

## 2025-05-19 PROCEDURE — 1160F RVW MEDS BY RX/DR IN RCRD: CPT | Performed by: NURSE PRACTITIONER

## 2025-05-19 PROCEDURE — G8427 DOCREV CUR MEDS BY ELIG CLIN: HCPCS | Performed by: NURSE PRACTITIONER

## 2025-05-19 PROCEDURE — G8417 CALC BMI ABV UP PARAM F/U: HCPCS | Performed by: NURSE PRACTITIONER

## 2025-05-19 ASSESSMENT — ENCOUNTER SYMPTOMS
BACK PAIN: 0
COUGH: 0
ABDOMINAL PAIN: 0
SHORTNESS OF BREATH: 0

## 2025-05-19 NOTE — PROGRESS NOTES
[facility]  [facility] PRIMARY [facility]ARE  [address] [place] [address]   [address]  [facility] [address]  Dept: [phone]  Dept Fax: [phone]    Dorian Mxion is a 84 y.o. male who presentstoday for his medical conditions/complaints as noted below.  Dorian Mixon is c/o of  Chief Complaint   Patient presents with    Medication Check     6 month follow up     Dermatology     Seeing Dr. Finch with [facility] for melanoma 2025          HPI:     Presents for 6 month recheck on chronic conditions with wife  BP well controlled  Has gained 4lb since LOV    Overall doing well  Following with derm for management of skin care  With est with Dr. Finch tomorrow    Hx of thrombocytopenia  Met with Dr. Duke in April, stable labs  Has recheck in one year    Willing ot update annual labs    Denies any other problems/concerns          Hemoglobin A1C (%)   Date Value   05/15/2024 5.7             ( goal A1C is < 7)   No components found for: \"LABMICR\"  No components found for: \"LDLCHOLESTEROL\", \"LDLCALC\"    (goal LDL is <100)   AST (U/L)   Date Value   05/15/2024 23     ALT (U/L)   Date Value   05/15/2024 8 (L)     BUN (mg/dL)   Date Value   05/15/2024 31 (H)     BP Readings from Last 3 Encounters:   25 118/72   24 124/68   05/15/24 124/68          (pign814/80)    Past Medical History:   Diagnosis Date    Cancer (HCC)     basal cell    Diverticulosis     coli    Plantar fasciitis       Past Surgical History:   Procedure Laterality Date    CATARACT REMOVAL WITH IMPLANT Bilateral may 2013    COLONOSCOPY      HERNIA REPAIR      SKIN CANCER EXCISION      multi sites    VEIN SURGERY         History reviewed. No pertinent family history.       Social History     Tobacco Use    Smoking status: Former     Current packs/day: 0.00     Average packs/day: 1 pack/day for 22.0 years (22.0 ttl pk-yrs)     Types: Cigarettes     Start date: 1959     Quit date: 1981     Years since quittin.4    Smokeless

## 2025-05-27 RX ORDER — ATORVASTATIN CALCIUM 10 MG/1
10 TABLET, FILM COATED ORAL DAILY
Qty: 90 TABLET | Refills: 0 | Status: SHIPPED | OUTPATIENT
Start: 2025-05-27

## 2025-06-15 DIAGNOSIS — I10 ESSENTIAL HYPERTENSION: Chronic | ICD-10-CM

## 2025-06-16 ENCOUNTER — TELEPHONE (OUTPATIENT)
Dept: PRIMARY CARE CLINIC | Age: 85
End: 2025-06-16

## 2025-06-16 RX ORDER — LISINOPRIL 20 MG/1
TABLET ORAL
Qty: 180 TABLET | Refills: 0 | Status: SHIPPED | OUTPATIENT
Start: 2025-06-16

## 2025-06-16 NOTE — TELEPHONE ENCOUNTER
Son Dorian called in today very concerned about the patient, I advised that he is not listed on his communications so I could not really disclose anything with him. He understood and just wanted to let you know a few things.    He was just recently discharged from the hospital with wounds on his feet. He is falling, his memory is bad. [facility] did not discharge him with any home care orders and he feels he should have help with his wounds and with taking his medications as he is now taking eliquis. He states he would like to be added to his forms and his chart so he is the person to be contacted. I advised that the patient has to come in and fill that out so I suggested when he does get scheduled for his hospital follow up that somebody come with him.     Johny Alarcon basically wondering if home health orders can be placed ASAP. There is a note in the patients media from the hospital visit as an fyi.

## 2025-06-17 ENCOUNTER — TELEPHONE (OUTPATIENT)
Dept: PRIMARY CARE CLINIC | Age: 85
End: 2025-06-17

## 2025-06-17 NOTE — TELEPHONE ENCOUNTER
Care Transitions Initial Follow Up Call    Outreach made within 2 business days of discharge: Yes    Patient: Dorian Mixon Patient : 1940   MRN: [id number]  Reason for Admission: Blood clot in his right leg   Discharge Date:  06/15/2025       Spoke with: Patient     Discharge department/facility: [facility]     TCM Interactive Patient Contact:  Was patient able to fill all prescriptions: Yes  Was patient instructed to bring all medications to the follow-up visit: No:   Is patient taking all medications as directed in the discharge summary? Yes  Does patient understand their discharge instructions: Yes  Does patient have questions or concerns that need addressed prior to 7-14 day follow up office visit: yes - Patient would like to know when he comes in to his appointment could he be taught how to use an ACE bandage.     Additional needs identified to be addressed with provider  No needs identified             Scheduled appointment with PCP within 7-14 days    Follow Up  Future Appointments   Date Time Provider Department Center   2025 10:30 AM Anitha Dobsno APRN - CNP [facility] DEP   2025 10:30 AM Anitha Dobson APRN - CNP [facility] DEP       Micheline Ruiz MA

## 2025-06-18 ENCOUNTER — OFFICE VISIT (OUTPATIENT)
Dept: PRIMARY CARE CLINIC | Age: 85
End: 2025-06-18

## 2025-06-18 VITALS
DIASTOLIC BLOOD PRESSURE: 74 MMHG | WEIGHT: 210.8 LBS | HEART RATE: 87 BPM | OXYGEN SATURATION: 98 % | BODY MASS INDEX: 30.25 KG/M2 | SYSTOLIC BLOOD PRESSURE: 116 MMHG

## 2025-06-18 DIAGNOSIS — I82.412 DVT OF DEEP FEMORAL VEIN, LEFT (HCC): Primary | ICD-10-CM

## 2025-06-18 RX ORDER — APIXABAN 5 MG (74)
KIT ORAL
COMMUNITY
Start: 2025-06-12

## 2025-06-18 RX ORDER — ATORVASTATIN CALCIUM 20 MG/1
20 TABLET, FILM COATED ORAL
COMMUNITY
Start: 2025-06-16

## 2025-06-18 RX ORDER — METOPROLOL SUCCINATE 25 MG/1
25 TABLET, EXTENDED RELEASE ORAL DAILY
COMMUNITY
Start: 2025-06-16

## 2025-06-18 RX ORDER — ASPIRIN 81 MG/1
81 TABLET, CHEWABLE ORAL DAILY
COMMUNITY
Start: 2025-06-16

## 2025-06-18 RX ORDER — CEPHALEXIN 500 MG/1
500 CAPSULE ORAL 4 TIMES DAILY
COMMUNITY
Start: 2025-06-15 | End: 2025-06-26

## 2025-06-18 RX ORDER — HYDROCODONE BITARTRATE AND ACETAMINOPHEN 5; 325 MG/1; MG/1
1 TABLET ORAL EVERY 4 HOURS PRN
COMMUNITY
Start: 2025-06-15 | End: 2025-06-22

## 2025-06-18 NOTE — PROGRESS NOTES
Post-Discharge Transitional Care Management Services      Dorian Mixon   YOB: 1940    Date of Visit:  6/18/2025  30 Day Post-Discharge Date: 6/15/25    Allergies   Allergen Reactions    Sulfa Antibiotics      Outpatient Medications Marked as Taking for the 6/18/25 encounter (Office Visit) with Anitha Dobson APRN - CNP   Medication Sig Dispense Refill    aspirin 81 MG chewable tablet Take 1 tablet by mouth daily      ELIQUIS DVT/PE STARTER PACK 5 MG TBPK tablet Take 2 tablets by mouth twice daily for 7 days, then take 1 tablet twice daily      HYDROcodone-acetaminophen (NORCO) 5-325 MG per tablet Take 1 tablet by mouth every 4 hours as needed.      cephALEXin (KEFLEX) 500 MG capsule Take 1 capsule by mouth 4 times daily      metoprolol succinate (TOPROL XL) 25 MG extended release tablet Take 1 tablet by mouth daily      atorvastatin (LIPITOR) 20 MG tablet 1 tablet      lisinopril (PRINIVIL;ZESTRIL) 20 MG tablet TAKE TWO TABLETS BY MOUTH ONCE DAILY 180 tablet 0    chlorthalidone (HYGROTON) 25 MG tablet TAKE ONE TABLET BY MOUTH ONE TIME DAILY 90 tablet 0    KLS ALLERCLEAR 10 MG tablet TAKE ONE TABLET BY MOUTH ONE TIME DAILY 90 tablet 0    Multiple Vitamins-Minerals (THERAPEUTIC MULTIVITAMIN-MINERALS) tablet Take 1 tablet by mouth daily           Vitals:    06/18/25 1036   BP: 116/74   Pulse: 87   SpO2: 98%   Weight: 95.6 kg (210 lb 12.8 oz)     Body mass index is 30.25 kg/m².     Wt Readings from Last 3 Encounters:   06/18/25 95.6 kg (210 lb 12.8 oz)   05/19/25 96.2 kg (212 lb)   11/18/24 94.5 kg (208 lb 6.4 oz)     BP Readings from Last 3 Encounters:   06/18/25 116/74   05/19/25 118/72   11/18/24 124/68        Patient was admitted to [facility] from 6/12/25 to 6/15/25 for DVT, Cellulitis.    Inpatient course: Discharge summary reviewed- see chart.    Current status: fair    Review of Systems:  A comprehensive review of systems was negative except for what was noted in the HPI.    Physical

## 2025-06-30 RX ORDER — LORATADINE 10 MG/1
10 TABLET ORAL DAILY
Qty: 90 TABLET | Refills: 0 | Status: SHIPPED | OUTPATIENT
Start: 2025-06-30

## 2025-07-14 RX ORDER — METOPROLOL SUCCINATE 25 MG/1
25 TABLET, EXTENDED RELEASE ORAL DAILY
Qty: 90 TABLET | Refills: 3 | Status: SHIPPED | OUTPATIENT
Start: 2025-07-14

## 2025-07-14 RX ORDER — ATORVASTATIN CALCIUM 20 MG/1
20 TABLET, FILM COATED ORAL DAILY
Qty: 90 TABLET | Refills: 3 | Status: SHIPPED | OUTPATIENT
Start: 2025-07-14

## 2025-07-14 NOTE — TELEPHONE ENCOUNTER
Last Visit Date: 6/18/2025   Next Visit Date: 11/21/2025     Recent medication changes prescribed while patient was admitted

## 2025-07-23 ENCOUNTER — TELEPHONE (OUTPATIENT)
Dept: PRIMARY CARE CLINIC | Age: 85
End: 2025-07-23

## 2025-07-23 NOTE — TELEPHONE ENCOUNTER
Yes please sign out 2 boxes if available  Also please check with insurance and look on website for savings eligibility

## 2025-07-23 NOTE — TELEPHONE ENCOUNTER
Luci from [facility] called in stating that the   Patient is on eliquis 5mg BID. States copay is $561. Wondering if we could give him some samples to help with this cost.

## 2025-07-24 RX ORDER — CHLORTHALIDONE 25 MG/1
25 TABLET ORAL DAILY
Qty: 90 TABLET | Refills: 0 | Status: SHIPPED | OUTPATIENT
Start: 2025-07-24